# Patient Record
Sex: FEMALE | Race: WHITE | Employment: PART TIME | URBAN - METROPOLITAN AREA
[De-identification: names, ages, dates, MRNs, and addresses within clinical notes are randomized per-mention and may not be internally consistent; named-entity substitution may affect disease eponyms.]

---

## 2022-10-06 ENCOUNTER — INITIAL PRENATAL (OUTPATIENT)
Dept: OBGYN CLINIC | Age: 27
End: 2022-10-06

## 2022-10-06 VITALS
HEIGHT: 64 IN | HEART RATE: 103 BPM | BODY MASS INDEX: 30.73 KG/M2 | SYSTOLIC BLOOD PRESSURE: 129 MMHG | DIASTOLIC BLOOD PRESSURE: 82 MMHG | WEIGHT: 180 LBS

## 2022-10-06 DIAGNOSIS — Z12.4 CERVICAL CANCER SCREENING: ICD-10-CM

## 2022-10-06 DIAGNOSIS — Z83.49 FH: CYSTIC FIBROSIS: ICD-10-CM

## 2022-10-06 DIAGNOSIS — Z82.79 FAMILY HISTORY OF CONGENITAL ANOMALIES: ICD-10-CM

## 2022-10-06 DIAGNOSIS — Z34.00 ENCOUNTER FOR SUPERVISION OF NORMAL INTRAUTERINE PREGNANCY IN PRIMIGRAVIDA, ANTEPARTUM: Primary | ICD-10-CM

## 2022-10-06 DIAGNOSIS — Z11.3 VENEREAL DISEASE SCREENING: ICD-10-CM

## 2022-10-06 DIAGNOSIS — F32.A DEPRESSION, UNSPECIFIED DEPRESSION TYPE: ICD-10-CM

## 2022-10-06 DIAGNOSIS — F41.9 ANXIETY: ICD-10-CM

## 2022-10-06 PROCEDURE — 0501F PRENATAL FLOW SHEET: CPT | Performed by: OBSTETRICS & GYNECOLOGY

## 2022-10-06 RX ORDER — MULTIVITAMIN/FOLIC ACID/BIOTIN 400-2000
TABLET ORAL
COMMUNITY

## 2022-10-06 RX ORDER — BUSPIRONE HYDROCHLORIDE 7.5 MG/1
7.5 TABLET ORAL AS NEEDED
COMMUNITY

## 2022-10-06 NOTE — PROGRESS NOTES
[address] [facility]  [email or web address]  [phone]    Radha Rajput MD, [address] [facility]     Chief complaint:  Irregular cycles  Last cycle; Patient's last menstrual period was 07/28/2022. This is a new concern and an evaluation is planned. The patient has not been previously tested for hemoglobin electrophoresis, CF, or SMA. Pt reports her  has a fam hx of CF. Cramping is like period cramps 3-4 out of 10. Pt prefers exam without partner in the room, pt has a hx of a painful pap smear. Current pregnancy history:  Brittany Phillips is a No obstetric history on file. , 32 y.o. female [address] [facility]   She presents for the evaluation of irregular menses and a positive pregnancy test.    LMP history:  Patient's last menstrual period was 07/28/2022. .  The date of the beginning of her last menstrual period is certain. Her menses are regular. Her cycles occur about every 4 weeks. A urine pregnancy test was positive about 4 weeks ago. She was not using contraception at the estimated time of conception. Based on her LMP, her EGA is 10 weeks,0 days with and EDC of 5/4/23. Ultrasound data:  She had an ultrasound today which revealed a viable shetty pregnancy with a gestational age of 9 weeks and 3 days giving an EDC of 5/1/23. TA ULTRASOUND PERFORMED  A SINGLE VIABLE 10W3D IUP IS SEEN WITH NORMAL CARDIAC RHYTHM. GESTATIONAL AGE BASED ON TODAYS ULTRASOUND. A NORMAL YOLK SAC IS SEEN. RIGHT OVARY APPEARS WITHIN NORMAL LIMITS. LEFT OVARY APPEARS WITHIN NORMAL LIMITS. NO FREE FLUID SEEN IN THE CDS. Pregnancy symptoms:  She reports fatigue  breast tenderness  nausea  positive home pregnancy test  frequent urination. She denies bleeding. Since she found out she is pregnant, she has gained, 12 lbs. She reports her prepregnancy weight as 168 pounds. Relevant past pregnancy history:  She has the following pregnancy history:none.   She does not have a history of  delivery. She does not have a history of a prior  section. Relevant past medical history:(relevant to this pregnancy):   none     Pap smear history:  Last pap smear: 2017    Occupational history  Her occupation is: part time job doing RN in [employer]. Substance history:   She does not report current tobacco use. She does not report current alcohol use. She does not report current drug use. Exposure history: There are indoor cat(s) in the home. The patient was instructed not to change cat litter boxes during pregnancy. She does report close contact with children on a regular basis. She has chicken pox or the vaccine in the past.   Patient does not report issues with domestic violence. Genetic Screening/Teratology Counseling:   (Includes patient, baby's father, or anyone in either family with:)  3.  Patient's age >/= 28 at EDC?--27      FOB age: 34years old. 2.  Thalassemia ([facility], [facility], [address] [facility], or  background): MCV<80?--no  3. Neural tube defect (meningomyelocele, spina bifida, anencephaly)? --no  4. Congenital heart defect?-- mob niece:  from congenital heart defect: 6mos  5. Down syndrome?--no  6. Jose R-Sachs ([facility])? --no  7. Canavan's Disease?--no  8. Familial Dysautonomia?--no   9. Sickle cell disease or trait ()? --no   Has she been tested for sickle trait: Unknown  10. Hemophilia or other blood disorders?--no  11. Muscular dystrophy?--no  12. Cystic fibrosis? --yes and FOB uncle (paternal)  15. Scott's Chorea?--no  14. Mental retardation/autism (if yes was person tested for Fragile X)?-no  15. Other inherited genetic or chromosomal disorder?- no  16. Maternal metabolic disorder (DM, PKU, etc)? --no  17. Patient or FOB with a child with a birth defect not listed above?--no  17a. Patient or FOB with a birth defect themselves?--no  25. Recurrent pregnancy loss, or stillbirth?--no  19.   Any medications since LMP other than prenatal vitamins (include vitamins, supplements, OTC meds, drugs, alcohol)? --   Current Outpatient Medications   Medication Instructions    busPIRone (BUSPAR) 7.5 mg, Oral, AS NEEDED    calcium/mag/vitamin D2/Zn/min (JEF-MAG ZINC II PO) Oral    multivitamin-folic acid-biotin (Hair-Skin-Nails, mv-FA-biotin,) 400-2,000 mcg tab Oral    PNV Comb #2-Iron-FA-Omega 3 29-1-400 mg cmpk Oral   zofran    20. Any other genetic/environmental exposure to discuss?--no. Infection History:  1. Lives with someone with TB or TB exposed?--no  2. Patient or partner has history of genital herpes?--no  3. Rash or viral illness since LMP?--no  4. History of STD (GC, CT, HPV, syphilis, HIV)? --no  5. Have you received a flu vaccine for the most recent flu season? -- no  6. Have you or your sexual partner(s) travelled to a [facility] area in the last six months? -- no  7. Have you received the COVID-19 vaccine? -- no    History reviewed. No pertinent past medical history. History reviewed. No pertinent surgical history. Social History     Occupational History    Not on file   Tobacco Use    Smoking status: Never    Smokeless tobacco: Never   Vaping Use    Vaping Use: Never used   Substance and Sexual Activity    Alcohol use: Not Currently    Drug use: Never    Sexual activity: Yes     Partners: Male     Family History   Problem Relation Age of Onset    Thyroid Disease Mother         hypo    Diabetes Father     Hypertension Father     Multiple myeloma Maternal Grandfather      OB History    Para Term  AB Living   1             SAB IAB Ectopic Molar Multiple Live Births                    # Outcome Date GA Lbr John Paul/2nd Weight Sex Delivery Anes PTL Lv   1 Current              Not on File  Prior to Admission medications    Medication Sig Start Date End Date Taking? Authorizing Provider   busPIRone (BUSPAR) 7.5 mg tablet Take 7.5 mg by mouth as needed.    Yes Provider, Historical   PNV Comb #2-Iron-FA-Omega 3 29-1-400 mg cmpk Take  by mouth. Yes Provider, Historical   calcium/mag/vitamin D2/Zn/min (JEF-MAG ZINC II PO) Take  by mouth. Yes Provider, Historical   multivitamin-folic acid-biotin (Hair-Skin-Nails, mv-FA-biotin,) 400-2,000 mcg tab Take  by mouth. Yes Provider, Historical        Review of Systems - History obtained from the patient  Constitutional: negative for weight loss, fever, night sweats  HEENT: negative for hearing loss, earache, congestion, snoring, sorethroat  CV: negative for chest pain, palpitations, edema  Resp: negative for cough, shortness of breath, wheezing  GI: negative for change in bowel habits, abdominal pain, black or bloody stools  : negative for frequency, dysuria, hematuria, vaginal discharge  MSK: negative for back pain, joint pain, muscle pain  Breast: negative for breast lumps, nipple discharge, galactorrhea  Skin :negative for itching, rash, hives  Neuro: negative for dizziness, headache, confusion, weakness  Psych: negative for anxiety, depression, change in mood  Heme/lymph: negative for bleeding, bruising, pallor    Objective:  Visit Vitals  /82   Pulse (!) 103   Ht 5' 4\" (1.626 m)   Wt 180 lb (81.6 kg)   LMP 07/28/2022   BMI 30.90 kg/m²       Physical Exam:   Constitutional  Appearance: well-nourished, well developed, alert, in no acute distress    HENT  Head  Face: appears normal  Eyes: appear normal  Ears: normal  Mouth: normal  Lips: no lesions    Neck  Inspection/Palpation: normal appearance, no masses or tenderness  Lymph Nodes: no lymphadenopathy present  Thyroid: gland size normal, nontender, no nodules or masses present on palpation    Chest  Respiratory Effort: breathing unlabored  Auscultation: normal breath sounds    Cardiovascular  Heart:   Auscultation: regular rate and rhythm without murmur    Breasts  Inspection of Breasts: breasts symmetrical, no skin changes, no discharge present, nipple appearance normal, no skin retraction present  Palpation of Breasts and Axillae: no masses present on palpation, no breast tenderness  Axillary Lymph Nodes: no lymphadenopathy present    Gastrointestinal  Abdominal Examination: abdomen non-tender to palpation, normal bowel sounds, no masses present  Liver and spleen: no hepatomegaly present, spleen not palpable  Hernias: no hernias identified    Genitourinary  External Genitalia: normal appearance for age, no discharge present, no tenderness present, no inflammatory lesions present, no masses present, no atrophy present  Vagina: normal vaginal vault without central or paravaginal defects, no discharge present, no inflammatory lesions present, no masses present  Bladder: non-tender to palpation  Urethra: appears normal  Cervix: normal appearing with discharge or lesions, os closed  Uterus: enlarged, normal shape, soft  Adnexa: no adnexal tenderness present, no adnexal masses present  Perineum: perineum within normal limits, no evidence of trauma, no rashes or skin lesions present  Anus: anus within normal limits, no hemorrhoids present  Inguinal Lymph Nodes: no lymphadenopathy present    Skin  General Inspection: no rash, no lesions identified    Neurologic/Psychiatric  Mental Status:  Orientation: grossly oriented to person, place and time  Mood and Affect: mood normal, affect appropriate    Assessment:   Irregular cycles  Encounter Diagnoses   Name Primary?     Encounter for supervision of normal intrauterine pregnancy in primigravida, antepartum Yes    Venereal disease screening     Cervical cancer screening     Family history of congenital anomalies     FH: cystic fibrosis     Anxiety     Depression, unspecified depression type      Due date: lmp cw US  Anxiety/depression on buspar, Wellbutrin  Constipation  Hemorrhoids  Ho back pain      Plan:   We discussed options of genetic screening and diagnostic testing including:  CF testing, CVS, amniocentesis first trimester screening/NT, MSAFP, and NIPT (handout given to patient for review and consent)  Disc safer options for constipation, bowel regimen ho  She is interested in prenatal genetic testing of her fetus. Plan: nips horizon, mfm fs  The course of pregnancy discussed including visit schedule, ultrasounds, lab testing, etc.  Pt advised to avoid alcoholic beverages and illicit/recreational drugs use  Recommend taking prenatal vitamins or folic acid daily with DHA/fish oil. The hospital and practice style discussed with coverage system. We discussed nutrition, toxoplasmosis precautions, sexual activity, exercise, need for influenza vaccine, environmental and work hazards, travel advice, screen for domestic violence, need for seat belts. We discussed seafood, unpasteurized dairy products, deli meat, artificial sweeteners, and caffeine intake. We recommend avoiding chemical and toxin exposures when possible. Information on prenatal and breastfeeding classes given. Information on circumcision given in ACOG packet. Patient encouraged not to smoke. Discussed current prescription drug use. Given medication list.  Discussed the use of over the counter medications and chemicals. She is advised to contact her MD with any questions. Pt understands risk of hemorrhage during pregnancy and post delivery and would accept blood products if necessary in life-threatening emergencies  We discussed signs and symptoms of abnormal pregnancies and miscarriage. Handouts given to pt. We discussed potential risk of pregnancy and maternal complications if patient has a COVID-19 infection during pregnancy and reviewed COVID-19 precautions and avoiding high risk transmission situations. We discussed that untreated or undertreated depression and anxiety is a significant maternal risk factor in pregnancy. Women with depression are less likely to care for themselves during pregnancy and are more likely to have fetal growth restriction and  delivery.  We also discussed the risk of poor maternal infant bonding. Additionally, those women are at significantly higher risk for severe postpartum depression. Antidepressants are safe to use while breastfeeding. Women who take antidepressants are more likely to successfully breastfeed that women who don't. The only known risk to the baby is \"jitteriness\" shortly after birth. We discussed that the benefits of antidepressant use in mom outweighs the small risk to the fetus. Disc can resume wellbutrin if needed (or wait until > 12 wks to decrease first tri exposure risk)    Physician review of ultrasound performed by technician  Today's ultrasound report and images were reviewed and discussed with the patient. Please see images and imaging report entered by technician in PACS for more detail and progress note and diagnosis entered by MD.    Belem Esparza MD    Orders Placed This Encounter    CULTURE, URINE    HEP B SURFACE AG    HIV [address], [address] [facility]. W/REFLEX CONFIRM    CBC W/O DIFF    RUBELLA AB, IGG    RPR    HEPATITIS C AB    MISC. LAB TEST    MISC. LAB TEST    REFERRAL TO PERINATOLOGY    TYPE & SCREEN    busPIRone (BUSPAR) 7.5 mg tablet    PNV Comb #2-Iron-FA-Omega 3 29-1-400 mg cmpk    calcium/mag/vitamin D2/Zn/min (JEF-MAG ZINC II PO)    multivitamin-folic acid-biotin (Hair-Skin-Nails, mv-FA-biotin,) 400-2,000 mcg tab    PAP IG, CT-NG-TV, RFX APTIMA HPV ASCUS ([id number])     Follow-up and Dispositions    Return in 4 weeks (on 11/3/2022) for ready labs today,  signup, Follow up OB visit. On this date, 10/6/2022,  I have spent 45 minutes reviewing previous notes, test results and face to face with the patient discussing the diagnosis and importance of compliance with the treatment plan as well as documenting on the day of the visit.

## 2022-10-09 LAB
ABO GROUP BLD: NORMAL
BACTERIA UR CULT: NO GROWTH
BLD GP AB SCN SERPL QL: NEGATIVE
ERYTHROCYTE [DISTWIDTH] IN BLOOD BY AUTOMATED COUNT: 12.9 % (ref 11.7–15.4)
HBV SURFACE AG SERPL QL IA: NEGATIVE
HCT VFR BLD AUTO: 41 % (ref 34–46.6)
HCV AB S/CO SERPL IA: <0.1 S/CO RATIO (ref 0–0.9)
HGB BLD-MCNC: 13.5 G/DL (ref 11.1–15.9)
HIV 1+2 AB+HIV1 P24 AG SERPL QL IA: NON REACTIVE
MCH RBC QN AUTO: 29.7 PG (ref 26.6–33)
MCHC RBC AUTO-ENTMCNC: 32.9 G/DL (ref 31.5–35.7)
MCV RBC AUTO: 90 FL (ref 79–97)
PLATELET # BLD AUTO: 358 X10E3/UL (ref 150–450)
RBC # BLD AUTO: 4.55 X10E6/UL (ref 3.77–5.28)
RH BLD: POSITIVE
RPR SER QL: NON REACTIVE
RUBV IGG SERPL IA-ACNC: 3.74 INDEX
WBC # BLD AUTO: 14.7 X10E3/UL (ref 3.4–10.8)

## 2022-10-10 NOTE — PROGRESS NOTES
Normal results, add to prenatal records. We can review in detail with patient at next visit.   Add to PL: \"Initial PNL done\"

## 2022-10-13 LAB
C TRACH RRNA CVX QL NAA+PROBE: NEGATIVE
CYTOLOGIST CVX/VAG CYTO: NORMAL
CYTOLOGY CVX/VAG DOC CYTO: NORMAL
CYTOLOGY CVX/VAG DOC THIN PREP: NORMAL
DX ICD CODE: NORMAL
LABCORP, 190119: NORMAL
Lab: NORMAL
N GONORRHOEA RRNA CVX QL NAA+PROBE: NEGATIVE
OTHER STN SPEC: NORMAL
STAT OF ADQ CVX/VAG CYTO-IMP: NORMAL
T VAGINALIS RRNA SPEC QL NAA+PROBE: NEGATIVE

## 2022-10-14 NOTE — PROGRESS NOTES
Normal pap smear, message sent if [facility] active. Update PMH/: include: Date of pap, Cytology: wnl. For HR HPV results: list NEG or POS, when done.

## 2022-10-17 NOTE — PROGRESS NOTES
NIPS low risk, horizon low risk 4/4: update PNL  Notify pt if EpicTopichart message not read or not active. Notify pt of gender, if desired.   Add to PL: NIPS- low risk, add sex if patient find out (XX or XY: see report)  Update PNL  Confirm 20 wk US scheduled: add date and location ([facility])  to PL

## 2022-10-18 ENCOUNTER — TELEPHONE (OUTPATIENT)
Dept: OBGYN CLINIC | Age: 27
End: 2022-10-18

## 2022-10-18 NOTE — TELEPHONE ENCOUNTER
Pt called name and  verified. pt is a  [id number] gestation. Pt states she clicked on image of [employer] results and was able to see gender which she did not want to know. Apologized to patient ,patient wanted to know accuracy of blood test gave kenan from [facility] to ask her.

## 2022-11-16 ENCOUNTER — ROUTINE PRENATAL (OUTPATIENT)
Dept: OBGYN CLINIC | Age: 27
End: 2022-11-16
Payer: OTHER GOVERNMENT

## 2022-11-16 VITALS
BODY MASS INDEX: 32.1 KG/M2 | DIASTOLIC BLOOD PRESSURE: 74 MMHG | HEART RATE: 96 BPM | HEIGHT: 64 IN | WEIGHT: 188 LBS | SYSTOLIC BLOOD PRESSURE: 110 MMHG

## 2022-11-16 DIAGNOSIS — Z34.00 PRENATAL CARE OF PRIMIGRAVIDA, ANTEPARTUM: ICD-10-CM

## 2022-11-16 DIAGNOSIS — Z34.00 ENCOUNTER FOR SUPERVISION OF NORMAL INTRAUTERINE PREGNANCY IN PRIMIGRAVIDA, ANTEPARTUM: Primary | ICD-10-CM

## 2022-11-16 DIAGNOSIS — Z23 NEEDS FLU SHOT: ICD-10-CM

## 2022-11-16 LAB — AFP, MATERNAL, EXTERNAL: NORMAL

## 2022-11-16 PROCEDURE — 90471 IMMUNIZATION ADMIN: CPT | Performed by: OBSTETRICS & GYNECOLOGY

## 2022-11-16 PROCEDURE — 90686 IIV4 VACC NO PRSV 0.5 ML IM: CPT | Performed by: OBSTETRICS & GYNECOLOGY

## 2022-11-16 PROCEDURE — 0502F SUBSEQUENT PRENATAL CARE: CPT | Performed by: OBSTETRICS & GYNECOLOGY

## 2022-11-16 NOTE — PATIENT INSTRUCTIONS
Vaccine Information Statement    Influenza (Flu) Vaccine (Inactivated or Recombinant): What You Need to Know    Many vaccine information statements are available in [place]ian and other languages. See www.immunize.org/vis. Hojas de información sobre vacunas están disponibles en español y en muchos otros idiomas. Visite www.immunize.org/vis. 1. Why get vaccinated? Influenza vaccine can prevent influenza (flu). Flu is a contagious disease that spreads around the [facility] every year, usually between October and May. Anyone can get the flu, but it is more dangerous for some people. Infants and young children, people 72 years and older, pregnant people, and people with certain health conditions or a weakened immune system are at greatest risk of flu complications. Pneumonia, bronchitis, sinus infections, and ear infections are examples of flu-related complications. If you have a medical condition, such as heart disease, cancer, or diabetes, flu can make it worse. Flu can cause fever and chills, sore throat, muscle aches, fatigue, cough, headache, and runny or stuffy nose. Some people may have vomiting and diarrhea, though this is more common in children than adults. In an average year, thousands of people in the [facility] die from flu, and many more are hospitalized. Flu vaccine prevents millions of illnesses and flu-related visits to the doctor each year. 2. Influenza vaccines     CDC recommends everyone 6 months and older get vaccinated every flu season. Children 6 months through 6years of age may need 2 doses during a single flu season. Everyone else needs only 1 dose each flu season. It takes about 2 weeks for protection to develop after vaccination. There are many flu viruses, and they are always changing. Each year a new flu vaccine is made to protect against the influenza viruses believed to be likely to cause disease in the upcoming flu season.  Even when the vaccine doesnt exactly match these viruses, it may still provide some protection. Influenza vaccine does not cause flu. Influenza vaccine may be given at the same time as other vaccines. 3. Talk with your health care provider    Tell your vaccination provider if the person getting the vaccine:  Has had an allergic reaction after a previous dose of influenza vaccine, or has any severe, life-threatening allergies   Has ever had Guillain-Barré Syndrome (also called GBS)    In some cases, your health care provider may decide to postpone influenza vaccination until a future visit. Influenza vaccine can be administered at any time during pregnancy. People who are or will be pregnant during influenza season should receive inactivated influenza vaccine. People with minor illnesses, such as a cold, may be vaccinated. People who are moderately or severely ill should usually wait until they recover before getting influenza vaccine. Your health care provider can give you more information. 4. Risks of a vaccine reaction    Soreness, redness, and swelling where the shot is given, fever, muscle aches, and headache can happen after influenza vaccination. There may be a very small increased risk of Guillain-Barré Syndrome (GBS) after inactivated influenza vaccine (the flu shot). Kyler Rios children who get the flu shot along with pneumococcal vaccine (PCV13) and/or DTaP vaccine at the same time might be slightly more likely to have a seizure caused by fever. Tell your health care provider if a child who is getting flu vaccine has ever had a seizure. People sometimes faint after medical procedures, including vaccination. Tell your provider if you feel dizzy or have vision changes or ringing in the ears. As with any medicine, there is a very remote chance of a vaccine causing a severe allergic reaction, other serious injury, or death. 5. What if there is a serious problem?     An allergic reaction could occur after the vaccinated person leaves the clinic. If you see signs of a severe allergic reaction (hives, swelling of the face and throat, difficulty breathing, a fast heartbeat, dizziness, or weakness), call 9-1-1 and get the person to the nearest hospital.    For other signs that concern you, call your health care provider. Adverse reactions should be reported to the Vaccine Adverse Event Reporting System (VAERS). Your health care provider will usually file this report, or you can do it yourself. Visit the VAERS website at www.vaers. [facility].gov or call 7-148.656.5530. VAERS is only for reporting reactions, and VAERS staff members do not give medical advice. 6. The National Vaccine Injury Compensation Program    The [facility] Vaccine Injury Compensation Program (VICP) is a federal program that was created to compensate people who may have been injured by certain vaccines. Claims regarding alleged injury or death due to vaccination have a time limit for filing, which may be as short as two years. Visit the VICP website at www.[facility].gov/vaccinecompensation or call 1-103.712.5707 to learn about the program and about filing a claim. 7. How can I learn more? Ask your health care provider. Call your local or state health department. Visit the website of the Food and Drug Administration (FDA) for vaccine package inserts and additional information at www.fda.gov/vaccines-blood-biologics/vaccines. Contact the Centers for Disease Control and Prevention (CDC): Call 6-140.560.1134 (1-800-CDC-INFO) or  Visit CDCs influenza website at www.cdc.gov/flu. Vaccine Information Statement   Inactivated Influenza Vaccine   8/6/2021  [id number]   Department of Health and Human Services  Centers for Disease Control and Prevention    Office Use Only

## 2022-11-18 LAB
AFP INTERP SERPL-IMP: NORMAL
AFP INTERP SERPL-IMP: NORMAL
AFP MOM SERPL: 1.43
AFP SERPL-MCNC: 41.2 NG/ML
AGE AT DELIVERY: 28.1 YR
COMMENT, 018013: NORMAL
GA METHOD: NORMAL
GA: 15.6 WEEKS
IDDM PATIENT QL: NO
MULTIPLE PREGNANCY: NO
NEURAL TUBE DEFECT RISK FETUS: 3311 %
RESULTS, 017004: NORMAL

## 2022-11-23 NOTE — PROGRESS NOTES
Meng Dyer is a 32 y.o. female who presents for flu immunization per verbal order from Misa Mcghee MD.  She denies any symptoms , reactions or allergies that would exclude them from being immunized today. Risks and adverse reactions were discussed and the VIS was given to her. All questions were addressed. She was observed for 15 min post injection. There were no reactions observed.
Normal results, add to prenatal records. We can review in detail with patient at next visit. Josefina W Morris Bowen message sent if active.
Updated PNL
_ [facility]  [email or web address]  [phone]  Douglas Stewart MD     Follow-up OB visit    Patient Active Problem List    Diagnosis Date Noted    Prenatal care of primigravida, antepartum 10/06/2022       Vitals:    22 1413   BP: 110/74   Pulse: 96   Weight: 188 lb (85.3 kg)   Height: 5' 4\" (1.626 m)     See PN flowsheet for exam    32 y.o.  15w6d   Encounter Diagnosis   Name Primary? Encounter for supervision of normal intrauterine pregnancy in primigravida, antepartum Yes        [] SAB/bleeding precautions reviewed   [] PTL/PPROM precautions reviewed   [x] Labor precautions reviewed   [] Fetal kick counts discussed   [] Labs reviewed with patient   [] Valentín Inoue precautions reviewed   [] Consent reviewed   [] Handouts given to pt   [] Glucola handout    [] GBS/labor/Magic Hour handout   []    [] We reviewed CDC recommendations for Tdap for patient and close contacts and RBA of receiving in pregnancy, advised obtaining in third trimester   [] Reviewed healthy nutrition in pregnancy and good exercise practices   [] We disc safer medications in pregnancy and referred patient to [facility] ERAST[facility] resources   [] We reviewed CDC recommendations for flu vaccine and RBA of receiving in pregnancy   []    []    []           No orders of the defined types were placed in this encounter.       Douglas Stewart MD
denies pain/discomfort

## 2022-11-30 ENCOUNTER — TELEPHONE (OUTPATIENT)
Dept: OBGYN CLINIC | Age: 27
End: 2022-11-30

## 2022-11-30 NOTE — TELEPHONE ENCOUNTER
32year old patient  17w6d pregnant    Patient calling to say that she woke up at 4am and her sleeping shorts were saturated and the bed was not wet. Patient reports cleaning up and when she woke up this am the new shorts damp but not wet as described above    Patient denies vaginal bleeding, contractions and has felt flutters of baby movement    Patient reports intercourse a couple of days ago    ? ov   patient has next appointment tomorrow 2022    Please advise

## 2022-11-30 NOTE — TELEPHONE ENCOUNTER
Alcira George MD  to Me    TP    10:44 AM   Rec 220 today if persistent leaking  (ob problem visit)   Or keep fu if not persistent     17w6d     TRP       Patient advised of MD recommendations and reports her underwear have not been wet since the earlier episode and she will continue to monitor at this time and keep her appointment for tomorrow    Patient verbalized understanding.

## 2022-12-01 ENCOUNTER — ROUTINE PRENATAL (OUTPATIENT)
Dept: OBGYN CLINIC | Age: 27
End: 2022-12-01
Payer: OTHER GOVERNMENT

## 2022-12-01 VITALS
BODY MASS INDEX: 32.4 KG/M2 | WEIGHT: 189.8 LBS | SYSTOLIC BLOOD PRESSURE: 114 MMHG | DIASTOLIC BLOOD PRESSURE: 78 MMHG | HEIGHT: 64 IN | HEART RATE: 102 BPM

## 2022-12-01 DIAGNOSIS — Z34.00 PRENATAL CARE OF PRIMIGRAVIDA, ANTEPARTUM: ICD-10-CM

## 2022-12-01 DIAGNOSIS — N89.8 VAGINAL DISCHARGE: Primary | ICD-10-CM

## 2022-12-01 LAB — FERN TEST, (POC): NORMAL

## 2022-12-01 PROCEDURE — 99212 OFFICE O/P EST SF 10 MIN: CPT | Performed by: OBSTETRICS & GYNECOLOGY

## 2022-12-01 PROCEDURE — 89060 EXAM SYNOVIAL FLUID CRYSTALS: CPT | Performed by: OBSTETRICS & GYNECOLOGY

## 2022-12-01 NOTE — PROGRESS NOTES
[address] [facility]  [email or web address]  [phone]    Davie Knott MD, FACOG       OB/GYN: OB Problem visit    Chief Complaint:   Chief Complaint   Patient presents with    Routine Prenatal Visit       Patient Active Problem List    Diagnosis    Prenatal care of primigravida, antepartum     Fh gregg anom: fs  Fh cf fob  Constipation/hemorroids  Anxiety depression on buspar, d/c wellbutrin  EPDS NV  10/6/2022  NIPS/Horizon  Normal prenatal  Initial prenatal done  NIPS low risk  Horizon low risk 4/4 11/16/22 flu shot given  11/16/22 AFP normal         Vaginal discharge, back to baseline, had gush yesterday, no SA x > 48 hrs    Per Rooming Note:    The patient is reporting having:  pt woke up at 4am yesterday & noticed her shorts were saturated with fluid, bed not wet, denies more discharge or trickling or odor. MFM on 12/15/22. This is a new problem. This is a routinely scheduled follow up OB visit. She has not experienced this problem before. She reports the symptoms are has significantly improved. Aggravating factors include none. And alleviating factors include none. She does not have other concerns. PFSH:  Past Medical History:   Diagnosis Date    Pap smear for cervical cancer screening 10/06/2022    normal; hpv tested     History reviewed. No pertinent surgical history. Family History   Problem Relation Age of Onset    Thyroid Disease Mother         hypo    Diabetes Father     Hypertension Father     Multiple myeloma Maternal Grandfather      Social History     Tobacco Use    Smoking status: Never    Smokeless tobacco: Never   Vaping Use    Vaping Use: Never used   Substance Use Topics    Alcohol use: Not Currently    Drug use: Never     No Known Allergies  Current Outpatient Medications   Medication Sig    buPROPion XL (WELLBUTRIN XL) 150 mg tablet Take 1 Tablet by mouth daily for 90 days. PNV Comb #2-Iron-FA-Omega 3 29-1-400 mg cmpk Take  by mouth. calcium/mag/vitamin D2/Zn/min (JEF-MAG ZINC II PO) Take  by mouth.    multivitamin-folic acid-biotin (Hair-Skin-Nails, mv-FA-biotin,) 400-2,000 mcg tab Take  by mouth.    busPIRone (BUSPAR) 7.5 mg tablet Take 7.5 mg by mouth as needed. (Patient not taking: No sig reported)     No current facility-administered medications for this visit.        Review of Systems:  History obtained from the patient and written ROS questionnaire  Constitutional: negative for fevers, chills and weight loss  ENT ROS: negative for - hearing change, oral lesions or visual changes  Respiratory: negative for cough, wheezing or dyspnea on exertion  Cardiovascular: negative for chest pain, irregular heart beats, exertional chest pressure/discomfort  Gastrointestinal: negative for dysphagia, nausea and vomiting  Genito-Urinary ROS: no dysuria, trouble voiding, or hematuria, see HPI  Inteument/breast: negative for rash, breast lump and nipple discharge  Musculoskeletal:negative for stiff joints, neck pain and muscle weakness  Endocrine ROS: negative for - breast changes, galactorrhea or temperature intolerance  Hematological and Lymphatic ROS: negative for - blood clots, bruising or swollen lymph nodes    Physical Exam:  Visit Vitals  /78   Pulse (!) 102   Ht 5' 4\" (1.626 m)   Wt 189 lb 12.8 oz (86.1 kg)   BMI 32.58 kg/m²       GENERAL: alert, well appearing, and in no distress  HEAD; normocephalic, atraumatic  PULM: clear to auscultation, no wheezes, rales or rhonchi, symmetric air entry   COR: normal rate and regular rhythm, S1 and S2 normal   ABDOMEN: soft, nontender, nondistended, no masses or organomegaly   BACK: normal range of motion, no tenderness, no CVAT   EGBUS: no lesions, no inflammation, no masses  VULVA: normal appearing vulva with no masses, tenderness or lesions  VAGINA: normal appearing vagina with normal color, no lesions, white discharge  Ntz neg  CERVIX: normal appearing cervix without discharge or lesions, non tender  UTERUS: uterus is enlarged in size, gravid appropriate for gestational age  ADNEXA: normal adnexa in size, nontender and no masses  NEURO: alert, oriented, normal speech    See PN flowsheet for additional notes and exam    Assessment:  32 y.o.  18w0d   Encounter Diagnoses   Name Primary? Vaginal discharge Yes    Prenatal care of primigravida, antepartum        Plan:  An evaluation of this patient's concern is planned. The patient is advised that she should contact the office if she does not note improvement or if symptoms recur  She should contact our office with any questions or concerns  She could keep her routine OB appointment. We discussed potential causes of vaginal discharge/irritation. We discussed good vulvar hygiene. Recommended avoid vaginal irritants. Discussed use of mild soaps/detergents. Follow up if NI. Patient will be notified about swab results and prescription sent, if indicated. Disc support pants/belt prn  Disc rba of flagyl/bv in pregnancy      Orders Placed This Encounter    NUSWAB VAGINITIS PLUS ([facility])    AMB POC FERN TEST       Results for orders placed or performed in visit on 22   AMB POC FERN TEST   Result Value Ref Range    FERN test, (POC)      Narrative    Negative, no ferning.         Jonah Morales MD

## 2022-12-01 NOTE — PROGRESS NOTES
Rooming note, OB problem visit    Chief Complaint   Patient presents with    Routine Prenatal Visit     Sirena Davila is a 32 y.o. female presents for a problem visit. 18w0d    The patient is reporting having:  pt woke up at 4am yesterday & noticed her shorts were saturated with fluid, bed not wet, denies more discharge or trickling or odor. MFM on 12/15/22. This is a new problem. This is a routinely scheduled follow up OB visit. She has not experienced this problem before. She reports the symptoms are has significantly improved. Aggravating factors include none. And alleviating factors include none. She does not have other concerns. 1. Have you been to the ER, urgent care clinic, or hospitalized since your last visit? No    2. Have you seen or consulted any other health care providers outside of the [address] [place] [address] since your last visit?  No

## 2022-12-04 LAB
A VAGINAE DNA VAG QL NAA+PROBE: NORMAL SCORE
BVAB2 DNA VAG QL NAA+PROBE: NORMAL SCORE
C ALBICANS DNA VAG QL NAA+PROBE: NEGATIVE
C GLABRATA DNA VAG QL NAA+PROBE: NEGATIVE
C TRACH DNA VAG QL NAA+PROBE: NEGATIVE
MEGA1 DNA VAG QL NAA+PROBE: NORMAL SCORE
N GONORRHOEA DNA VAG QL NAA+PROBE: NEGATIVE
T VAGINALIS DNA VAG QL NAA+PROBE: NEGATIVE

## 2022-12-06 NOTE — PROGRESS NOTES
The results are normal, no clear evidence of vaginal infection  [address] message sent if active. Recommend f/u if still having symptoms/problems or has additional concerns.

## 2022-12-15 ENCOUNTER — HOSPITAL ENCOUNTER (OUTPATIENT)
Dept: PERINATAL CARE | Age: 27
Discharge: HOME | End: 2022-12-15
Attending: OBSTETRICS & GYNECOLOGY
Payer: OTHER GOVERNMENT

## 2022-12-15 PROCEDURE — 76811 OB US DETAILED SNGL FETUS: CPT | Performed by: OBSTETRICS & GYNECOLOGY

## 2022-12-20 NOTE — PROGRESS NOTES
Can you confirm w MFM and on consult referral that has FH congenital cardiac anomaly (niece)?   Thanks

## 2022-12-21 NOTE — PROGRESS NOTES
Call to [facility]- they are looking into it. See phone encounter for additional correspondence.

## 2022-12-28 ENCOUNTER — ROUTINE PRENATAL (OUTPATIENT)
Dept: OBGYN CLINIC | Age: 27
End: 2022-12-28
Payer: OTHER GOVERNMENT

## 2022-12-28 VITALS
SYSTOLIC BLOOD PRESSURE: 128 MMHG | BODY MASS INDEX: 33.64 KG/M2 | WEIGHT: 196 LBS | DIASTOLIC BLOOD PRESSURE: 83 MMHG | HEART RATE: 121 BPM

## 2022-12-28 DIAGNOSIS — Z3A.21 21 WEEKS GESTATION OF PREGNANCY: ICD-10-CM

## 2022-12-28 DIAGNOSIS — Z34.00 PRENATAL CARE OF PRIMIGRAVIDA, ANTEPARTUM: ICD-10-CM

## 2022-12-28 DIAGNOSIS — R00.2 PALPITATIONS: Primary | ICD-10-CM

## 2022-12-28 DIAGNOSIS — R00.0 TACHYCARDIA: ICD-10-CM

## 2022-12-28 PROCEDURE — 0502F SUBSEQUENT PRENATAL CARE: CPT | Performed by: OBSTETRICS & GYNECOLOGY

## 2022-12-28 NOTE — PROGRESS NOTES
_ [facility]  [email or web address]  [phone]  Uma Richardson MD     Follow-up OB visit    Patient Active Problem List    Diagnosis Date Noted    Prenatal care of primigravida, antepartum 10/06/2022       Vitals:    22 1040   BP: 128/83   Pulse: (!) 121   Weight: 196 lb (88.9 kg)     See PN flowsheet for exam  1can diet soda/day. Occ palpitations; even before pregnancy. + sob x1 when lying down xmas william. Chest  Respiratory Effort: breathing normal        Auscultation: normal breath sounds, clear bilaterally    Cardiovascular  Heart: Auscultation: regular rate and rhythm without murmur, normal S1, S2  Ext no c/t/e    32 y.o.  21w6d   Routine prenatal visit  Encounter Diagnoses   Name Primary? Palpitations Yes    Tachycardia     Prenatal care of primigravida, antepartum      PE/DVT precautions, rec to ER if sx persist/severe.   Cardiology referral  Tsh  Cbc  Disc rba of 81 mg asa, pt will begin   [] SAB/bleeding precautions reviewed   [] PTL/PPROM precautions reviewed   [] Labor precautions reviewed   [] Fetal kick counts discussed   [] Labs reviewed with patient   [] [place] precautions reviewed   [] Consent reviewed   [] Handouts given to pt   [] Glucola handout    [] GBS/labor/Magic Hour handout   []    [] We reviewed CDC recommendations for Tdap for patient and close contacts and RBA of receiving in pregnancy, advised obtaining in third trimester   [] Reviewed healthy nutrition in pregnancy and good exercise practices   [] We disc safer medications in pregnancy and referred patient to [facility]   [] We reviewed CDC recommendations for flu vaccine and RBA of receiving in pregnancy   []    []    []           Orders Placed This Encounter    TSH 3RD GENERATION    CBC W/O DIFF       Uma Richardson MD

## 2022-12-29 LAB
ERYTHROCYTE [DISTWIDTH] IN BLOOD BY AUTOMATED COUNT: 13 % (ref 11.5–14.5)
HCT VFR BLD AUTO: 39.7 % (ref 35–47)
HGB BLD-MCNC: 12.9 G/DL (ref 11.5–16)
MCH RBC QN AUTO: 30.3 PG (ref 26–34)
MCHC RBC AUTO-ENTMCNC: 32.5 G/DL (ref 30–36.5)
MCV RBC AUTO: 93.2 FL (ref 80–99)
NRBC # BLD: 0 K/UL (ref 0–0.01)
NRBC BLD-RTO: 0 PER 100 WBC
PLATELET # BLD AUTO: 307 K/UL (ref 150–400)
PMV BLD AUTO: 11.3 FL (ref 8.9–12.9)
RBC # BLD AUTO: 4.26 M/UL (ref 3.8–5.2)
TSH SERPL DL<=0.05 MIU/L-ACNC: 2.68 UIU/ML (ref 0.36–3.74)
WBC # BLD AUTO: 14.2 K/UL (ref 3.6–11)

## 2022-12-30 NOTE — PROGRESS NOTES
Normal results, add to prenatal records. We can review in detail with patient at next visit. [address] message sent if active.

## 2023-01-12 ENCOUNTER — HOSPITAL ENCOUNTER (OUTPATIENT)
Dept: PERINATAL CARE | Age: 28
Discharge: HOME | End: 2023-01-12
Attending: OBSTETRICS & GYNECOLOGY
Payer: OTHER GOVERNMENT

## 2023-01-12 PROCEDURE — 76816 OB US FOLLOW-UP PER FETUS: CPT | Performed by: OBSTETRICS & GYNECOLOGY

## 2023-01-26 ENCOUNTER — ROUTINE PRENATAL (OUTPATIENT)
Dept: OBGYN CLINIC | Age: 28
End: 2023-01-26
Payer: OTHER GOVERNMENT

## 2023-01-26 VITALS
SYSTOLIC BLOOD PRESSURE: 116 MMHG | HEIGHT: 64 IN | DIASTOLIC BLOOD PRESSURE: 81 MMHG | WEIGHT: 204.6 LBS | HEART RATE: 108 BPM | BODY MASS INDEX: 34.93 KG/M2

## 2023-01-26 DIAGNOSIS — Z34.00 PRENATAL CARE OF PRIMIGRAVIDA, ANTEPARTUM: Primary | ICD-10-CM

## 2023-01-26 PROCEDURE — 0502F SUBSEQUENT PRENATAL CARE: CPT | Performed by: OBSTETRICS & GYNECOLOGY

## 2023-01-26 NOTE — PROGRESS NOTES
[facility] OB-[facility]  [email or web address]  [phone]  Carmen Cui MD     Follow-up OB visit    Patient Active Problem List    Diagnosis Date Noted    Prenatal care of primigravida, antepartum 10/06/2022       Vitals:    23 0942 23 0943   BP: 129/87 116/81   Pulse: (!) 109 (!) 108   Weight: 204 lb 9.6 oz (92.8 kg)    Height: 5' 4\" (1.626 m)      See PN flowsheet for exam  Disc 28 wk labs nv  Disc yo andreas/bs classes     32 y.o.  26w0d   Routine prenatal visit  No diagnosis found. [] SAB/bleeding precautions reviewed   [] PTL/PPROM precautions reviewed   [] Labor precautions reviewed   [] Fetal kick counts discussed   [] Labs reviewed with patient   [] Db [place] precautions reviewed   [] Consent reviewed   [] Handouts given to pt   [] Glucola handout    [] GBS/labor/Magic Hour handout   []    [] We reviewed CDC recommendations for Tdap for patient and close contacts and RBA of receiving in pregnancy, advised obtaining in third trimester   [] Reviewed healthy nutrition in pregnancy and good exercise practices   [] We disc safer medications in pregnancy and referred patient to [facility] resources   [] We reviewed CDC recommendations for flu vaccine and RBA of receiving in pregnancy   []    []    []           No orders of the defined types were placed in this encounter.       Carmen Cui MD

## 2023-01-31 NOTE — PATIENT INSTRUCTIONS
Vaccine Information Statement    Tdap (Tetanus, Diphtheria, Pertussis) Vaccine: What You Need to Know     Many vaccine information statements are available in Polish and other languages. See www.immunize.org/vis. Hojas de información sobre vacunas están disponibles en español y en muchos otros idiomas. Visite www.immunize.org/vis. 1. Why get vaccinated? Tdap vaccine can prevent tetanus, diphtheria, and pertussis. Diphtheria and pertussis spread from person to person. Tetanus enters the body through cuts or wounds. TETANUS (T) causes painful stiffening of the muscles. Tetanus can lead to serious health problems, including being unable to open the mouth, having trouble swallowing and breathing, or death. DIPHTHERIA (D) can lead to difficulty breathing, heart failure, paralysis, or death. PERTUSSIS (aP), also known as whooping cough, can cause uncontrollable, violent coughing that makes it hard to breathe, eat, or drink. Pertussis can be extremely serious especially in babies and young children, causing pneumonia, convulsions, brain damage, or death. In teens and adults, it can cause weight loss, loss of bladder control, passing out, and rib fractures from severe coughing. 2. Tdap vaccine     Tdap is only for children 7 years and older, adolescents, and adults. Adolescents should receive a single dose of Tdap, preferably at age 6 or 15 years. Pregnant people should get a dose of Tdap during every pregnancy, preferably during the early part of the third trimester, to help protect the  from pertussis. Infants are most at risk for severe, life-threatening complications from pertussis. Adults who have never received Tdap should get a dose of Tdap.       Also, adults should receive a booster dose of either Tdap or Td (a different vaccine that protects against tetanus and diphtheria but not pertussis) every 10 years, or after 5 years in the case of a severe or dirty wound or burn.     Tdap may be given at the same time as other vaccines. 3. Talk with your health care provider    Tell your vaccination provider if the person getting the vaccine:  Has had an allergic reaction after a previous dose of any vaccine that protects against tetanus, diphtheria, or pertussis, or has any severe, life-threatening allergies   Has had a coma, decreased level of consciousness, or prolonged seizures within 7 days after a previous dose of any pertussis vaccine (DTP, DTaP, or Tdap)  Has seizures or another nervous system problem  Has ever had Guillain-Barré Syndrome (also called GBS)  Has had severe pain or swelling after a previous dose of any vaccine that protects against tetanus or diphtheria    In some cases, your health care provider may decide to postpone Tdap vaccination until a future visit. People with minor illnesses, such as a cold, may be vaccinated. People who are moderately or severely ill should usually wait until they recover before getting Tdap vaccine. Your health care provider can give you more information. 4. Risks of a vaccine reaction    Pain, redness, or swelling where the shot was given, mild fever, headache, feeling tired, and nausea, vomiting, diarrhea, or stomachache sometimes happen after Tdap vaccination. People sometimes faint after medical procedures, including vaccination. Tell your provider if you feel dizzy or have vision changes or ringing in the ears. As with any medicine, there is a very remote chance of a vaccine causing a severe allergic reaction, other serious injury, or death. 5. What if there is a serious problem? An allergic reaction could occur after the vaccinated person leaves the clinic.  If you see signs of a severe allergic reaction (hives, swelling of the face and throat, difficulty breathing, a fast heartbeat, dizziness, or weakness), call 9-1-1 and get the person to the nearest hospital.    For other signs that concern you, call your health care provider. Adverse reactions should be reported to the Vaccine Adverse Event Reporting System (VAERS). Your health care provider will usually file this report, or you can do it yourself. Visit the VAERS website at www.vaers. [facility].gov or call 6-323.968.3115. VAERS is only for reporting reactions, and VAERS staff members do not give medical advice. 6. The National Vaccine Injury Compensation Program    The [facility], [facility] within [facility] Vaccine Injury Compensation Program (VICP) is a federal program that was created to compensate people who may have been injured by certain vaccines. Claims regarding alleged injury or death due to vaccination have a time limit for filing, which may be as short as two years. Visit the VICP website at www.[facility].gov/vaccinecompensation or call 3-735.419.9162 to learn about the program and about filing a claim. 7. How can I learn more? Ask your health care provider. Call your local or state health department. Visit the website of the Food and Drug Administration (FDA) for vaccine package inserts and additional information at www.fda.gov/vaccines-blood-biologics/vaccines. Contact the Centers for Disease Control and Prevention (CDC): Call 5-517.628.9271 (1-800-CDC-INFO) or  Visit CDCs website at www.cdc.gov/vaccines. Vaccine Information Statement   Tdap (Tetanus, Diphtheria, Pertussis) Vaccine  8/6/2021  42 FIORELLA Avila [id number]   Department of Health and Human Services  Centers for Disease Control and Prevention    Office Use Only

## 2023-02-09 ENCOUNTER — ROUTINE PRENATAL (OUTPATIENT)
Dept: OBGYN CLINIC | Age: 28
End: 2023-02-09

## 2023-02-09 ENCOUNTER — HOSPITAL ENCOUNTER (OUTPATIENT)
Dept: PERINATAL CARE | Age: 28
Discharge: HOME | End: 2023-02-09
Attending: OBSTETRICS & GYNECOLOGY
Payer: OTHER GOVERNMENT

## 2023-02-09 VITALS
SYSTOLIC BLOOD PRESSURE: 113 MMHG | WEIGHT: 209 LBS | DIASTOLIC BLOOD PRESSURE: 76 MMHG | HEIGHT: 64 IN | HEART RATE: 97 BPM | BODY MASS INDEX: 35.68 KG/M2

## 2023-02-09 DIAGNOSIS — Z34.00 PRENATAL CARE OF PRIMIGRAVIDA, ANTEPARTUM: ICD-10-CM

## 2023-02-09 DIAGNOSIS — Z3A.28 28 WEEKS GESTATION OF PREGNANCY: ICD-10-CM

## 2023-02-09 DIAGNOSIS — F41.9 ANXIETY: Primary | ICD-10-CM

## 2023-02-09 DIAGNOSIS — Z23 ENCOUNTER FOR IMMUNIZATION: ICD-10-CM

## 2023-02-09 LAB — GTT 60 MIN, EXTERNAL: 77

## 2023-02-09 PROCEDURE — 76816 OB US FOLLOW-UP PER FETUS: CPT | Performed by: OBSTETRICS & GYNECOLOGY

## 2023-02-09 RX ORDER — BUPROPION HYDROCHLORIDE 300 MG/1
300 TABLET ORAL DAILY
Qty: 90 TABLET | Refills: 1 | Status: SHIPPED | OUTPATIENT
Start: 2023-02-09 | End: 2023-05-10

## 2023-02-09 NOTE — PROGRESS NOTES
[facility]  [email or web address]  [phone]  Jayleen Guidry MD     Follow-up OB visit    Patient Active Problem List    Diagnosis Date Noted    Prenatal care of primigravida, antepartum 10/06/2022       Vitals:    23 0838   BP: 113/76   Pulse: 97   Weight: 209 lb (94.8 kg)   Height: 5' 4\" (1.626 m)     See PN flowsheet for exam    32 y.o.  28w0d   Routine prenatal visit  Encounter Diagnoses   Name Primary? Encounter for immunization Yes    Prenatal care of primigravida, antepartum         [] SAB/bleeding precautons reviewed  EPDS   [x] PTL/PPROM precautions reviewed   [] Labor precautions reviewed   [] Fetal kick counts discussed   [] Labs reviewed with patient   [] Yuriy Fisher precautions reviewed   [x] Consent reviewed  Disc FSE/IUPC use in labor   [] Handouts given to pt   [] Glucola handout    [] GBS/labor/Magic Hour handout   []    [] We reviewed CDC recommendations for Tdap for patient and close contacts and RBA of receiving in pregnancy, advised obtaining in third trimester   [] Reviewed healthy nutrition in pregnancy and good exercise practices   [] We disc safer medications in pregnancy and referred patient to [facility] resources   [] We reviewed CDC recommendations for flu vaccine and RBA of receiving in pregnancy   []    []    []       Follow-up and Dispositions    Return for Follow up OB visit.          Orders Placed This Encounter    AR IMMUNIZ ADMIN,1 SINGLE/COMB VAC/TOXOID    Tetanus, diphtheria toxoids and acellular pertussis (TDAP) vaccine, in individuals >=7 years, IM    CBC W/O DIFF    GLUCOSE, GESTATIONAL 1 HR TOLERANCE         Jayleen Guidry MD

## 2023-02-09 NOTE — PROGRESS NOTES
[facility]  [email or web address]  [phone]    Elham Richardson MD, [address]       OB/GYN Problem visit      HPI  Rohini Vences is a , 32 y.o. female who presents for a problem visit. Chief Complaint   Patient presents with    Routine Prenatal Visit       Pt co worsening anxiety. She reports her sx are better on wellbutrin but felt better on 300mg in past.  She does not have a counselor. Sexual history and Contraception:  Social History     Substance and Sexual Activity   Sexual Activity Yes    Partners: Male       Past Medical History:   Diagnosis Date    Pap smear for cervical cancer screening 10/06/2022    normal; hpv tested     Current Outpatient Medications   Medication Sig    buPROPion XL (WELLBUTRIN XL) 300 mg XL tablet Take 1 Tablet by mouth daily for 90 days. PNV Comb #2-Iron-FA-Omega 3 29-1-400 mg cmpk Take  by mouth.    calcium/mag/vitamin D2/Zn/min (JEF-MAG ZINC II PO) Take  by mouth.    busPIRone (BUSPAR) 7.5 mg tablet Take 7.5 mg by mouth as needed. (Patient not taking: No sig reported)     No current facility-administered medications for this visit. OB History    Para Term  AB Living   1             SAB IAB Ectopic Molar Multiple Live Births                    # Outcome Date GA Lbr John Paul/2nd Weight Sex Delivery Anes PTL Lv   1 Current              History reviewed. No pertinent surgical history.   Family History   Problem Relation Age of Onset    Thyroid Disease Mother         hypo    Diabetes Father     Hypertension Father     Multiple myeloma Maternal Grandfather      Social History     Socioeconomic History    Marital status:      Spouse name: Not on file    Number of children: Not on file    Years of education: Not on file    Highest education level: Not on file   Occupational History    Not on file   Tobacco Use    Smoking status: Never    Smokeless tobacco: Never   Vaping Use    Vaping Use: Never used   Substance and Sexual Activity    Alcohol use: Not Currently    Drug use: Never    Sexual activity: Yes     Partners: Male   Other Topics Concern    Not on file   Social History Narrative    Not on file     Social Determinants of Health     Financial Resource Strain: Not on file   Food Insecurity: Not on file   Transportation Needs: Not on file   Physical Activity: Not on file   Stress: Not on file   Social Connections: Not on file   Intimate Partner Violence: Not on file   Housing Stability: Not on file     Tobacco History:  reports that she has never smoked. She has never used smokeless tobacco.  Alcohol Abuse:  reports that she does not currently use alcohol. Drug Abuse:  reports no history of drug use. No Known Allergies    Patient Active Problem List   Diagnosis Code    Prenatal care of primigravida, antepartum Z34.00             Review of Systems: History obtained from the patient  Constitutional: see HPI  Breast: negative for breast lumps, nipple discharge, galactorrhea  GI: negative for change in bowel habits, abdominal pain, black or bloody stools  : see HPI   MSK: negative for back pain, joint pain, muscle pain  Skin: negative for itching, rash, hives  Psych: negative for anxiety, depression, change in mood      Objective:  Visit Vitals  /76   Pulse 97   Ht 5' 4\" (1.626 m)   Wt 209 lb (94.8 kg)   LMP 07/28/2022   BMI 35.87 kg/m²       PHYSICAL EXAMINATION:  GENERAL: alert, well appearing, and in no distress  HEAD: normocephalic, atraumatic. ABDOMEN: soft, nontender, nondistended, no masses or organomegaly, gravid  NEURO: alert, grossly oriented to place and time, normal speech,       ASSESSMENT:    ICD-10-CM ICD-9-CM    1. Anxiety  F41.9 300.00       2. Encounter for immunization  Z23 V03.89 TDAP, BOOSTRIX, (AGE 10 YRS+), IM      ME IMMUNIZ ADMIN,1 SINGLE/COMB VAC/TOXOID      3.  Prenatal care of primigravida, antepartum  Z34.00 V22.0 TDAP, BOOSTRIX, (AGE 10 YRS+), IM      CBC W/O DIFF      GLUCOSE, GESTATIONAL 1 HR TOLERANCE      FL IMMUNIZ ADMIN,1 SINGLE/COMB VAC/TOXOID      GLUCOSE, GESTATIONAL 1 HR TOLERANCE      CBC W/O DIFF      4. 28 weeks gestation of pregnancy  Z3A.28 V22.2           PLAN:  Orders Placed This Encounter    FL IMMUNIZ ADMIN,1 SINGLE/COMB VAC/TOXOID    Tetanus, diphtheria toxoids and acellular pertussis (TDAP) vaccine, in individuals >=7 years, IM    CBC W/O DIFF     Standing Status:   Future     Number of Occurrences:   1     Standing Expiration Date:   2024    GLUCOSE, GESTATIONAL 1 HR TOLERANCE     Standing Status:   Future     Number of Occurrences:   1     Standing Expiration Date:   2024    buPROPion XL (WELLBUTRIN XL) 300 mg XL tablet     Sig: Take 1 Tablet by mouth daily for 90 days. Dispense:  90 Tablet     Refill:  1       The patient was instructed to follow up as needed if symptoms persist or worsen. Instructions were given to patient and the patient was given the opportunity to ask any questions concerning the visit today. The patient should keep/make her routine well woman exam.  The patient should contact our office with any questions or concerns. Disc rba of SSRI in pregnancy and balancing medication exposure vs a sub-therapeutic dosing of wellbutrin  Disc risks of pp depression    We discussed that untreated or undertreated depression and anxiety is a significant maternal risk factor in pregnancy. Women with depression are less likely to care for themselves during pregnancy and are more likely to have fetal growth restriction and  delivery. We also discussed the risk of poor maternal infant bonding. Additionally, those women are at significantly higher risk for severe postpartum depression. Antidepressants are safe to use while breastfeeding. Women who take antidepressants are more likely to successfully breastfeed that women who don't. The only known risk to the baby is \"jitteriness\" shortly after birth.  We discussed that the benefits of antidepressant use in mom outweighs the small risk to the fetus. Today, 02/09/23, I personally spent more than 10 minutes in review of records, documentation,  and face to face counseling with the patient discussing the diagnosis, plan of care and importance of compliance with the treatment plan and performing an exam as well as ordering any appropropriate labs, procedures, or medications. No results found for this visit on 02/09/23.

## 2023-02-10 LAB
ERYTHROCYTE [DISTWIDTH] IN BLOOD BY AUTOMATED COUNT: 14.1 % (ref 11.5–14.5)
GLUCOSE 1H P 100 G GLC PO SERPL-MCNC: 77 MG/DL (ref 65–140)
HCT VFR BLD AUTO: 38.5 % (ref 35–47)
HGB BLD-MCNC: 11.9 G/DL (ref 11.5–16)
MCH RBC QN AUTO: 29.8 PG (ref 26–34)
MCHC RBC AUTO-ENTMCNC: 30.9 G/DL (ref 30–36.5)
MCV RBC AUTO: 96.3 FL (ref 80–99)
NRBC # BLD: 0 K/UL (ref 0–0.01)
NRBC BLD-RTO: 0 PER 100 WBC
PLATELET # BLD AUTO: 301 K/UL (ref 150–400)
PMV BLD AUTO: 11.4 FL (ref 8.9–12.9)
RBC # BLD AUTO: 4 M/UL (ref 3.8–5.2)
WBC # BLD AUTO: 16 K/UL (ref 3.6–11)

## 2023-02-11 NOTE — PROGRESS NOTES
Normal glucola/labs  Update PNL/chart  Can discuss at nv or notify pt. [address] message sent if active.

## 2023-02-13 ENCOUNTER — OFFICE VISIT (OUTPATIENT)
Dept: CARDIOLOGY CLINIC | Age: 28
End: 2023-02-13
Payer: OTHER GOVERNMENT

## 2023-02-13 VITALS
BODY MASS INDEX: 35.75 KG/M2 | SYSTOLIC BLOOD PRESSURE: 124 MMHG | WEIGHT: 209.4 LBS | HEART RATE: 108 BPM | HEIGHT: 64 IN | OXYGEN SATURATION: 98 % | DIASTOLIC BLOOD PRESSURE: 82 MMHG

## 2023-02-13 DIAGNOSIS — R00.0 TACHYCARDIA: ICD-10-CM

## 2023-02-13 DIAGNOSIS — R00.2 PALPITATIONS: Primary | ICD-10-CM

## 2023-02-13 DIAGNOSIS — Z76.89 ENCOUNTER TO ESTABLISH CARE: ICD-10-CM

## 2023-02-13 PROCEDURE — 99204 OFFICE O/P NEW MOD 45 MIN: CPT | Performed by: INTERNAL MEDICINE

## 2023-02-13 PROCEDURE — 93000 ELECTROCARDIOGRAM COMPLETE: CPT | Performed by: INTERNAL MEDICINE

## 2023-02-13 RX ORDER — GUAIFENESIN 100 MG/5ML
81 LIQUID (ML) ORAL DAILY
COMMUNITY

## 2023-02-13 NOTE — PROGRESS NOTES
Chief Complaint   Patient presents with    New Patient    Referral / Consult     Tachy  By Edgar Stafford        Vitals:    02/13/23 1020   BP: 124/82   Pulse: (!) 108   Height: 5' 4\" (1.626 m)   Weight: 209 lb 6.4 oz (95 kg)   SpO2: 98%         Chest pain: no    SOB: no    Palpitations: no    Dizziness: no    Swelling: no    Refills:       Have you been to the ER, urgent care clinic since your last visit? Hospitalized since your last visit? no    Have you seen or consulted other health care providers outside of the [facility] since your last visit?  (Include any pap smears or colon screening.)        No pcp at this time New to area

## 2023-02-13 NOTE — PROGRESS NOTES
Per Dr. Dalia Wallace NP- echo and follow up. 48 hr holter applied in office- [id number] instructions given on use and return.  (Sent to monitor team)

## 2023-02-13 NOTE — PROGRESS NOTES
Tiara Sweeney MD, MS, [address] [facility]            HISTORY OF PRESENT ILLNESS:    Carole Amaya is a 32 y.o. female referred by Dr. Román Cano for tachycardia and palpitations. She is 28 weeks pregnant with her first child, boy. Had an episode in Dec where she was traveling and felt like she could not breathe. She felt better once she got up to walk. Has a couple episodes of palpations/tachycardia even before pregnancy. Can not find triggers. Has anxiety. She feels she is adequately hydrating. FH- mom HTN, dad HTN and MI 2 years ago    Denies chest pain, edema, syncope, shortness of breath at rest, dyspnea on exertion, PND or orthopnea. EKG shows sinus tachycardia. Discussed 48h holter (placed today in clinic), echo + FU. SUMMARY:   Problem List  Date Reviewed: 2/9/2023            Codes Class Noted    Palpitations ICD-10-CM: R00.2  ICD-9-CM: 785.1  2/13/2023        Prenatal care of primigravida, antepartum ICD-10-CM: Z34.00  ICD-9-CM: V22.0  10/6/2022    Overview Addendum 2/12/2023  3:24 PM by Fidel Ireland MD     Fh gregg anom: fs, fu 24 wks anatomy   Fh cf fob  Constipation/hemorroids  Anxiety depression on buspar, d/c wellbutrin: EPDS w glucola  EPDS NV  10/6/2022  Initial prenatal done  NIPS low risk  Horizon low risk 4/4 11/16/22 flu shot given  MFM rec ASA, post plac, fu 4wk  81 mg asa 12/28/2022   Palp/tachy: cbc/tsh/cards  12/28/22 - normal prenatal results   Post plac , CI not seen, mfm fu 4wk growth, fu prn  Requests tub room  Lives 45 min away                Current Outpatient Medications on File Prior to Visit   Medication Sig    aspirin 81 mg chewable tablet Take 81 mg by mouth daily. buPROPion XL (WELLBUTRIN XL) 300 mg XL tablet Take 1 Tablet by mouth daily for 90 days. PNV Comb #2-Iron-FA-Omega 3 29-1-400 mg cmpk Take  by mouth.    calcium/mag/vitamin D2/Zn/min (JEF-MAG ZINC II PO) Take  by mouth.    busPIRone (BUSPAR) 7.5 mg tablet Take 7.5 mg by mouth as needed.  (Patient not taking: No sig reported)     No current facility-administered medications on file prior to visit. CARDIOLOGY STUDIES TO DATE:  No results found for this visit on 02/13/23. CARDIAC ROS:   positive for palpitations    Past Medical History:   Diagnosis Date    Pap smear for cervical cancer screening 10/06/2022    normal; hpv tested       Family History   Problem Relation Age of Onset    Thyroid Disease Mother         hypo    Diabetes Father     Hypertension Father     Multiple myeloma Maternal Grandfather        Social History     Socioeconomic History    Marital status:      Spouse name: Not on file    Number of children: Not on file    Years of education: Not on file    Highest education level: Not on file   Occupational History    Not on file   Tobacco Use    Smoking status: Never    Smokeless tobacco: Never   Vaping Use    Vaping Use: Never used   Substance and Sexual Activity    Alcohol use: Not Currently    Drug use: Never    Sexual activity: Yes     Partners: Male   Other Topics Concern    Not on file   Social History Narrative    Not on file     Social Determinants of Health     Financial Resource Strain: Not on file   Food Insecurity: Not on file   Transportation Needs: Not on file   Physical Activity: Not on file   Stress: Not on file   Social Connections: Not on file   Intimate Partner Violence: Not on file   Housing Stability: Not on file        GENERAL ROS:  A comprehensive review of systems was negative except for that written in the HPI.     Visit Vitals  /82   Pulse (!) 108   Ht 5' 4\" (1.626 m)   Wt 95 kg (209 lb 6.4 oz)   LMP 07/28/2022   SpO2 98%   BMI 35.94 kg/m²     Vitals:    02/13/23 1020   BP: 124/82   Pulse: (!) 108   SpO2: 98%   Weight: 95 kg (209 lb 6.4 oz)   Height: 5' 4\" (1.626 m)        Wt Readings from Last 3 Encounters:   02/13/23 95 kg (209 lb 6.4 oz)   02/09/23 94.8 kg (209 lb)   01/26/23 92.8 kg (204 lb 9.6 oz)            BP Readings from Last 3 Encounters:   02/13/23 124/82   02/09/23 113/76   01/26/23 116/81       PHYSICAL EXAM  General appearance: alert, cooperative, no distress, appears stated age  Neck: supple, symmetrical, trachea midline, no adenopathy, thyroid: not enlarged, symmetric, no tenderness/mass/nodules, no carotid bruit, and no JVD  Lungs: clear to auscultation bilaterally  Heart: regular rate and rhythm, S1, S2 normal, no murmur, click, rub or gallop  Extremities: extremities normal, atraumatic, no cyanosis or edema    No results found for: CHOL, CHOLX, CHLST, CHOLV, 002303, HDL, HDLP, LDL, LDLC, DLDLP, TGLX, TRIGL, TRIGP, CHHD, CHHDX    Lab Results   Component Value Date/Time    WBC 16.0 (H) 02/09/2023 12:21 PM    HGB 11.9 02/09/2023 12:21 PM    HCT 38.5 02/09/2023 12:21 PM    PLATELET 712 56/69/3377 12:21 PM    MCV 96.3 02/09/2023 12:21 PM        No results found for: CHOL, CHOLPOCT, CHOLX, CHLST, CHOLV, HDL, HDLP, LDL, LDLC, DLDLP, TGLX, TRIGL, TRIGP, TGLPOCT, NTGLT, CHHD, CHHDX     ASSESSMENT  Diagnoses and all orders for this visit:    1. Palpitations  -     AMB POC EKG ROUTINE W/ 12 LEADS, INTER & REP  48 hr holter  Echo to rule out cardiac involvement    2. Tachycardia  Holter as above    3. Encounter to establish care       Encounter Diagnoses   Name Primary? Palpitations Yes    Encounter to establish care     Tachycardia      Orders Placed This Encounter    AMB POC EKG ROUTINE W/ 12 LEADS, INTER & REP    aspirin 81 mg chewable tablet         1000 Renee Costa MD  2/13/2023        [address]  [address] [place] [address]  [address]  [phone] (F)    380 [facility]  [address]  [phone] (P)  [phone] (F)    ATTENTION:   This medical record was transcribed using an electronic medical records/speech recognition system. Although proofread, it may and can contain electronic, spelling and other errors. Corrections may be executed at a later time.   Please feel free to contact us for any clarifications as needed.

## 2023-02-14 DIAGNOSIS — Z34.00 PRENATAL CARE OF PRIMIGRAVIDA, ANTEPARTUM: ICD-10-CM

## 2023-02-23 ENCOUNTER — ROUTINE PRENATAL (OUTPATIENT)
Dept: OBGYN CLINIC | Age: 28
End: 2023-02-23
Payer: OTHER GOVERNMENT

## 2023-02-23 VITALS — SYSTOLIC BLOOD PRESSURE: 133 MMHG | WEIGHT: 212.4 LBS | BODY MASS INDEX: 36.46 KG/M2 | DIASTOLIC BLOOD PRESSURE: 82 MMHG

## 2023-02-23 DIAGNOSIS — Z34.00 PRENATAL CARE OF PRIMIGRAVIDA, ANTEPARTUM: Primary | ICD-10-CM

## 2023-02-23 PROCEDURE — 0502F SUBSEQUENT PRENATAL CARE: CPT | Performed by: OBSTETRICS & GYNECOLOGY

## 2023-02-23 NOTE — PROGRESS NOTES
_ [address] [facility]  [email or web address]  [phone]  Jazmyne Giron MD     Follow-up OB visit    Patient Active Problem List    Diagnosis Date Noted    Palpitations 2023    Prenatal care of primigravida, antepartum 10/06/2022       Vitals:    23 0932   BP: 133/82   Weight: 212 lb 6.4 oz (96.3 kg)     See PN flowsheet for exam    32 y.o.  30w0d   Routine prenatal visit  Encounter Diagnosis   Name Primary? Prenatal care of primigravida, antepartum Yes     Disc call coverage  Reviewed from last visit  Disc using support belt prn   [] SAB/bleeding precautions reviewed   [x] PTL/PPROM precautions reviewed   [] Labor precautions reviewed   [] Fetal kick counts discussed   [] Labs reviewed with patient   [] Ladi Lezama precautions reviewed   [] Consent reviewed   [] Handouts given to pt   [] Glucola handout    [] GBS/labor/Magic Hour handout   []    [] We reviewed CDC recommendations for Tdap for patient and close contacts and RBA of receiving in pregnancy, advised obtaining in third trimester   [] Reviewed healthy nutrition in pregnancy and good exercise practices   [] We disc safer medications in pregnancy and referred patient to [facility] ERASTO resources   [] We reviewed CDC recommendations for flu vaccine and RBA of receiving in pregnancy   []    []    []       Follow-up and Dispositions    Return for Follow up OB visit. No orders of the defined types were placed in this encounter.       Jazmyne Giron MD

## 2023-03-01 ENCOUNTER — PATIENT MESSAGE (OUTPATIENT)
Dept: CARDIOLOGY CLINIC | Age: 28
End: 2023-03-01

## 2023-03-09 ENCOUNTER — ROUTINE PRENATAL (OUTPATIENT)
Dept: OBGYN CLINIC | Age: 28
End: 2023-03-09
Payer: OTHER GOVERNMENT

## 2023-03-09 VITALS
SYSTOLIC BLOOD PRESSURE: 125 MMHG | HEIGHT: 64 IN | DIASTOLIC BLOOD PRESSURE: 83 MMHG | BODY MASS INDEX: 36.54 KG/M2 | WEIGHT: 214 LBS | HEART RATE: 105 BPM

## 2023-03-09 DIAGNOSIS — Z34.00 PRENATAL CARE OF PRIMIGRAVIDA, ANTEPARTUM: Primary | ICD-10-CM

## 2023-03-09 PROCEDURE — 0502F SUBSEQUENT PRENATAL CARE: CPT | Performed by: OBSTETRICS & GYNECOLOGY

## 2023-03-09 RX ORDER — FAMOTIDINE 20 MG/1
20 TABLET, FILM COATED ORAL 2 TIMES DAILY
COMMUNITY

## 2023-03-09 NOTE — PROGRESS NOTES
[facility]  [email or web address]  [phone]  Mayuri Deras MD     Follow-up OB visit    Patient Active Problem List    Diagnosis Date Noted    Palpitations 2023    Prenatal care of primigravida, antepartum 10/06/2022       Vitals:    23 0931   BP: 125/83   Pulse: (!) 105   Weight: 214 lb (97.1 kg)   Height: 5' 4\" (1.626 m)     See PN flowsheet for exam  Stable palpitations    32 y.o.  32w0d   Routine prenatal visit  Encounter Diagnosis   Name Primary? Prenatal care of primigravida, antepartum Yes         Disc options of delayed cord clamping and keeping baby warm vs leaving on vernix     [] SAB/bleeding precautions reviewed   [x] PTL/PPROM precautions reviewed   [] Labor precautions reviewed   [] Fetal kick counts discussed   [] Labs reviewed with patient   [] Meghna Sleek precautions reviewed   [] Consent reviewed   [] Handouts given to pt   [] Glucola handout    [] GBS/labor/Magic Hour handout   []    [] We reviewed CDC recommendations for Tdap for patient and close contacts and RBA of receiving in pregnancy, advised obtaining in third trimester   [] Reviewed healthy nutrition in pregnancy and good exercise practices   [] We disc safer medications in pregnancy and referred patient to [facility] ERASTO resources   [] We reviewed CDC recommendations for flu vaccine and RBA of receiving in pregnancy   []    []    []       Follow-up and Dispositions    Return for Follow up OB visit. No orders of the defined types were placed in this encounter.       Mayuri Deras MD

## 2023-03-23 ENCOUNTER — ROUTINE PRENATAL (OUTPATIENT)
Dept: OBGYN CLINIC | Age: 28
End: 2023-03-23

## 2023-03-23 VITALS
DIASTOLIC BLOOD PRESSURE: 83 MMHG | SYSTOLIC BLOOD PRESSURE: 119 MMHG | BODY MASS INDEX: 37.76 KG/M2 | WEIGHT: 221.2 LBS | HEIGHT: 64 IN | HEART RATE: 98 BPM

## 2023-03-23 DIAGNOSIS — Z34.00 PRENATAL CARE OF PRIMIGRAVIDA, ANTEPARTUM: ICD-10-CM

## 2023-03-23 NOTE — PROGRESS NOTES
LIMITED OB SCAN  A SINGLE VERTEX 34W0D IUP IS SEEN. FETAL CARDIAC MOTION OBSERVED. LIMITED ANATOMY WAS VISUALIZED AND APPEARS WNL. APPROPRIATE FETAL GROWTH IS SEEN. SIZE=DATES. PEACE AND PLACENTA APPEAR WITHIN NORMAL LIMITS.

## 2023-03-23 NOTE — PROGRESS NOTES
I discussed with the patient the limitations of ultrasound and that imaging can not rule out all birth defects, chromosomal problems, or other problems with the baby. [facility]  [email or web address]  [phone]  Elham Richardson MD     Follow-up OB visit    Patient Active Problem List    Diagnosis Date Noted    Palpitations 2023    Prenatal care of primigravida, antepartum 10/06/2022       Vitals:    23 0953   BP: 119/83   Pulse: 98   Weight: 221 lb 3.2 oz (100.3 kg)   Height: 5' 4\" (1.626 m)     See PN flowsheet for exam    32 y.o.  34w0d   Routine prenatal visit  Encounter Diagnosis   Name Primary? Prenatal care of primigravida, antepartum         [] SAB/bleeding precautions reviewed   [x] PTL/PPROM precautions reviewed   [] Labor precautions reviewed   [] Fetal kick counts discussed   [] Labs reviewed with patient   [] Brenda Bench precautions reviewed   [] Consent reviewed   [] Handouts given to pt   [] Glucola handout    [] GBS/labor/Magic Hour handout   []    [] We reviewed CDC recommendations for Tdap for patient and close contacts and RBA of receiving in pregnancy, advised obtaining in third trimester   [] Reviewed healthy nutrition in pregnancy and good exercise practices   [] We disc safer medications in pregnancy and referred patient to [facility]   [] We reviewed CDC recommendations for flu vaccine and RBA of receiving in pregnancy   []    []    []           No orders of the defined types were placed in this encounter.       Elham Richardson MD

## 2023-04-03 ENCOUNTER — OFFICE VISIT (OUTPATIENT)
Dept: CARDIOLOGY CLINIC | Age: 28
End: 2023-04-03

## 2023-04-03 ENCOUNTER — ANCILLARY PROCEDURE (OUTPATIENT)
Dept: CARDIOLOGY CLINIC | Age: 28
End: 2023-04-03
Payer: OTHER GOVERNMENT

## 2023-04-03 DIAGNOSIS — Z76.89 ENCOUNTER TO ESTABLISH CARE: ICD-10-CM

## 2023-04-03 DIAGNOSIS — R00.2 PALPITATIONS: Primary | ICD-10-CM

## 2023-04-03 DIAGNOSIS — R00.2 PALPITATIONS: ICD-10-CM

## 2023-04-03 DIAGNOSIS — R00.0 TACHYCARDIA: ICD-10-CM

## 2023-04-03 LAB
ECHO AO ROOT DIAM: 2.9 CM
ECHO AO ROOT INDEX: 1.42 CM/M2
ECHO AV MEAN GRADIENT: 3 MMHG
ECHO AV MEAN VELOCITY: 0.8 M/S
ECHO AV PEAK GRADIENT: 5 MMHG
ECHO AV PEAK VELOCITY: 1.1 M/S
ECHO AV VELOCITY RATIO: 0.73
ECHO AV VTI: 17.9 CM
ECHO LA DIAMETER INDEX: 1.67 CM/M2
ECHO LA DIAMETER: 3.4 CM
ECHO LA TO AORTIC ROOT RATIO: 1.17
ECHO LA VOL 2C: 30 ML (ref 22–52)
ECHO LA VOL 4C: 28 ML (ref 22–52)
ECHO LA VOL BP: 32 ML (ref 22–52)
ECHO LA VOL/BSA BIPLANE: 16 ML/M2 (ref 16–34)
ECHO LA VOLUME AREA LENGTH: 33 ML
ECHO LA VOLUME INDEX A2C: 15 ML/M2 (ref 16–34)
ECHO LA VOLUME INDEX A4C: 14 ML/M2 (ref 16–34)
ECHO LA VOLUME INDEX AREA LENGTH: 16 ML/M2 (ref 16–34)
ECHO LV E' LATERAL VELOCITY: 7 CM/S
ECHO LV EDV A2C: 61 ML
ECHO LV EDV A4C: 75 ML
ECHO LV EDV BP: 69 ML (ref 56–104)
ECHO LV EDV INDEX A4C: 37 ML/M2
ECHO LV EDV INDEX BP: 34 ML/M2
ECHO LV EDV NDEX A2C: 30 ML/M2
ECHO LV EJECTION FRACTION A2C: 47 %
ECHO LV EJECTION FRACTION A4C: 63 %
ECHO LV EJECTION FRACTION BIPLANE: 56 % (ref 55–100)
ECHO LV ESV A2C: 33 ML
ECHO LV ESV A4C: 28 ML
ECHO LV ESV BP: 30 ML (ref 19–49)
ECHO LV ESV INDEX A2C: 16 ML/M2
ECHO LV ESV INDEX A4C: 14 ML/M2
ECHO LV ESV INDEX BP: 15 ML/M2
ECHO LV FRACTIONAL SHORTENING: 35 % (ref 28–44)
ECHO LV INTERNAL DIMENSION DIASTOLE INDEX: 2.25 CM/M2
ECHO LV INTERNAL DIMENSION DIASTOLIC: 4.6 CM (ref 3.9–5.3)
ECHO LV INTERNAL DIMENSION SYSTOLIC INDEX: 1.47 CM/M2
ECHO LV INTERNAL DIMENSION SYSTOLIC: 3 CM
ECHO LV IVSD: 0.9 CM (ref 0.6–0.9)
ECHO LV MASS 2D: 137.7 G (ref 67–162)
ECHO LV MASS INDEX 2D: 67.5 G/M2 (ref 43–95)
ECHO LV POSTERIOR WALL DIASTOLIC: 0.9 CM (ref 0.6–0.9)
ECHO LV RELATIVE WALL THICKNESS RATIO: 0.39
ECHO LVOT AV VTI INDEX: 0.66
ECHO LVOT MEAN GRADIENT: 1 MMHG
ECHO LVOT PEAK GRADIENT: 3 MMHG
ECHO LVOT PEAK VELOCITY: 0.8 M/S
ECHO LVOT VTI: 11.8 CM
ECHO RV FREE WALL PEAK S': 11 CM/S
ECHO RV INTERNAL DIMENSION: 3.6 CM
ECHO RV TAPSE: 1.6 CM (ref 1.7–?)

## 2023-04-03 PROCEDURE — 99214 OFFICE O/P EST MOD 30 MIN: CPT | Performed by: INTERNAL MEDICINE

## 2023-04-03 PROCEDURE — 93306 TTE W/DOPPLER COMPLETE: CPT | Performed by: INTERNAL MEDICINE

## 2023-04-03 NOTE — PROGRESS NOTES
Jeff Maxwell MD, MS, [address] [facility]            HISTORY OF PRESENT ILLNESS:    Akash Artis is a 29 y.o. female referred by Dr. Ruthann Khoury for tachycardia and palpitations. She is 28 weeks pregnant with her first child, boy. Had an episode in Dec where she was traveling and felt like she could not breathe. She felt better once she got up to walk. Has a couple episodes of palpations/tachycardia even before pregnancy. Can not find triggers. Has anxiety. She feels she is adequately hydrating. FH- mom HTN, dad HTN and MI 2 years ago    Denies chest pain, edema, syncope, shortness of breath at rest, dyspnea on exertion, PND or orthopnea. EKG shows sinus tachycardia. Discussed 48h holter (placed today in clinic), echo + FU. Echo today shows normal LV fxn, EF 60%, no valve disease. Holter NSR, no arrhythmia, average HR 94, ranged from . Feels SOB - due early May. SUMMARY:   Problem List  Date Reviewed: 4/3/2023            Codes Class Noted    Palpitations ICD-10-CM: R00.2  ICD-9-CM: 785.1  2/13/2023        Prenatal care of primigravida, antepartum ICD-10-CM: Z34.00  ICD-9-CM: V22.0  10/6/2022    Overview Addendum 3/23/2023 10:11 AM by Erma Victor MD      gregg anom: fs, fu 24 wks anatomy   Fh cf fob  Constipation/hemorroids  Anxiety depression on buspar, d/c wellbutrin: EPDS w glucola  EPDS NV  10/6/2022  Initial prenatal done  NIPS low risk  Horizon low risk 4/4 11/16/22 flu shot given  MFM rec ASA, post plac, fu 4wk  81 mg asa 12/28/2022   Palp/tachy: cbc/tsh/cards  12/28/22 - normal prenatal results   2/9/23 - normal 1hr gtt 68  Post plac , CI not seen, mfm fu 4wk growth, fu prn  Requests tub room  Lives 45 min away  Prefers to avoid cx exams                Current Outpatient Medications on File Prior to Visit   Medication Sig    famotidine (PEPCID) 20 mg tablet Take 20 mg by mouth two (2) times a day. aspirin 81 mg chewable tablet Take 81 mg by mouth daily.     buPROPion XL (WELLBUTRIN XL) 300 mg XL tablet Take 1 Tablet by mouth daily for 90 days. PNV Comb #2-Iron-FA-Omega 3 29-1-400 mg cmpk Take  by mouth.    calcium/mag/vitamin D2/Zn/min (JEF-MAG ZINC II PO) Take  by mouth.    busPIRone (BUSPAR) 7.5 mg tablet Take 7.5 mg by mouth as needed. (Patient not taking: No sig reported)     No current facility-administered medications on file prior to visit. CARDIOLOGY STUDIES TO DATE:  No results found for this visit on 04/03/23. CARDIAC ROS:   positive for palpitations    Past Medical History:   Diagnosis Date    Pap smear for cervical cancer screening 10/06/2022    normal; hpv tested       Family History   Problem Relation Age of Onset    Thyroid Disease Mother         hypo    Diabetes Father     Hypertension Father     Multiple myeloma Maternal Grandfather        Social History     Socioeconomic History    Marital status:      Spouse name: Not on file    Number of children: Not on file    Years of education: Not on file    Highest education level: Not on file   Occupational History    Not on file   Tobacco Use    Smoking status: Never    Smokeless tobacco: Never   Vaping Use    Vaping Use: Never used   Substance and Sexual Activity    Alcohol use: Not Currently    Drug use: Never    Sexual activity: Yes     Partners: Male   Other Topics Concern    Not on file   Social History Narrative    Not on file     Social Determinants of Health     Financial Resource Strain: Not on file   Food Insecurity: Not on file   Transportation Needs: Not on file   Physical Activity: Not on file   Stress: Not on file   Social Connections: Not on file   Intimate Partner Violence: Not on file   Housing Stability: Not on file        GENERAL ROS:  A comprehensive review of systems was negative except for that written in the HPI.     Visit Vitals  /78 (BP 1 Location: Left upper arm, BP Patient Position: Sitting)   Pulse 98   Ht 5' 4\" (1.626 m)   Wt 99.8 kg (220 lb)   LMP 07/28/2022   SpO2 98%   BMI 37.76 kg/m²     Vitals:    04/03/23 1409   BP: 116/78   Pulse: 98   SpO2: 98%   Weight: 99.8 kg (220 lb)   Height: 5' 4\" (1.626 m)        Wt Readings from Last 3 Encounters:   04/03/23 99.8 kg (220 lb)   04/03/23 99.8 kg (220 lb)   03/23/23 100.3 kg (221 lb 3.2 oz)            BP Readings from Last 3 Encounters:   04/03/23 116/78   04/03/23 116/78   03/23/23 119/83       PHYSICAL EXAM  General appearance: alert, cooperative, no distress, appears stated age  Neck: supple, symmetrical, trachea midline, no adenopathy, thyroid: not enlarged, symmetric, no tenderness/mass/nodules, no carotid bruit, and no JVD  Lungs: clear to auscultation bilaterally  Heart: regular rate and rhythm, S1, S2 normal, no murmur, click, rub or gallop  Extremities: extremities normal, atraumatic, no cyanosis or edema    No results found for: CHOL, CHOLX, CHLST, CHOLV, 754889, HDL, HDLP, LDL, LDLC, DLDLP, TGLX, TRIGL, TRIGP, CHHD, CHHDX    Lab Results   Component Value Date/Time    WBC 16.0 (H) 02/09/2023 12:21 PM    HGB 11.9 02/09/2023 12:21 PM    HCT 38.5 02/09/2023 12:21 PM    PLATELET 890 99/18/2858 12:21 PM    MCV 96.3 02/09/2023 12:21 PM        No results found for: CHOL, CHOLPOCT, CHOLX, CHLST, CHOLV, HDL, HDLP, LDL, LDLC, DLDLP, TGLX, TRIGL, TRIGP, TGLPOCT, NTGLT, CHHD, CHHDX     ASSESSMENT  Diagnoses and all orders for this visit:    1. Palpitations  Echo normal LV function. 2. Tachycardia  Holter NSR. 3. Encounter to establish care           Eduardo Joseph MD  4/3/2023        [address] [place]   [address] [facility] [address]  [place], [address] [place] [address] [address]  [phone] (F)    [address], [id number] [place] [address] [address]  [phone] (P)  [phone] (F)    ATTENTION:   This medical record was transcribed using an electronic medical records/speech recognition system. Although proofread, it may and can contain electronic, spelling and other errors. Corrections may be executed at a later time. Please feel free to contact us for any clarifications as needed.

## 2023-04-03 NOTE — PROGRESS NOTES
Chief Complaint   Patient presents with    Follow-up     Echo by Rama Kerr today. Palpitations     Tachy     Other     Heart Monitor result.       Vitals:    04/03/23 1409   BP: 116/78   BP 1 Location: Left upper arm   BP Patient Position: Sitting   Pulse: 98   Height: 5' 4\" (1.626 m)   Weight: 220 lb (99.8 kg)   SpO2: 98%       Chest pain denied     SOB denied     Palpitations denied     Swelling in hands/feet denied     Dizziness denied     Recent hospital stays denied     Refills denied

## 2023-04-06 ENCOUNTER — ROUTINE PRENATAL (OUTPATIENT)
Dept: OBGYN CLINIC | Age: 28
End: 2023-04-06
Payer: OTHER GOVERNMENT

## 2023-04-06 PROCEDURE — 0502F SUBSEQUENT PRENATAL CARE: CPT | Performed by: OBSTETRICS & GYNECOLOGY

## 2023-04-06 NOTE — PROGRESS NOTES
Problem List  Date Reviewed: 4/3/2023          Codes Class Noted    Palpitations ICD-10-CM: R00.2  ICD-9-CM: 785.1  2/13/2023        Prenatal care of primigravida, antepartum ICD-10-CM: Z34.00  ICD-9-CM: V22.0  10/6/2022    Overview Addendum 3/23/2023 10:11 AM by Eunice Russell MD     Fh gregg anom: fs, fu 24 wks anatomy   Fh cf fob  Constipation/hemorroids  Anxiety depression on buspar, d/c wellbutrin: EPDS w glucola  EPDS NV  10/6/2022  Initial prenatal done  NIPS low risk  Horizon low risk 4/4 11/16/22 flu shot given  MFM rec ASA, post plac, fu 4wk  81 mg asa 12/28/2022   Palp/tachy: cbc/tsh/cards  12/28/22 - normal prenatal results   2/9/23 - normal 1hr gtt 77  Post plac , CI not seen, mfm fu 4wk growth, fu prn  Requests tub room  Lives 45 min away  Prefers to avoid cx exams

## 2023-04-20 ENCOUNTER — ROUTINE PRENATAL (OUTPATIENT)
Dept: OBGYN CLINIC | Age: 28
End: 2023-04-20
Payer: OTHER GOVERNMENT

## 2023-04-20 VITALS
SYSTOLIC BLOOD PRESSURE: 129 MMHG | HEIGHT: 64 IN | DIASTOLIC BLOOD PRESSURE: 86 MMHG | BODY MASS INDEX: 39.61 KG/M2 | WEIGHT: 232 LBS | HEART RATE: 103 BPM

## 2023-04-20 DIAGNOSIS — Z34.00 PRENATAL CARE OF PRIMIGRAVIDA, ANTEPARTUM: Primary | ICD-10-CM

## 2023-04-20 PROCEDURE — 0502F SUBSEQUENT PRENATAL CARE: CPT | Performed by: OBSTETRICS & GYNECOLOGY

## 2023-04-20 NOTE — PROGRESS NOTES
[facility]  [email or web address]  [phone]  Tamara Poole MD     Follow-up OB visit    Patient Active Problem List    Diagnosis Date Noted    Palpitations 2023    Prenatal care of primigravida, antepartum 10/06/2022       Vitals:    23 0921   BP: 129/86   Pulse: (!) 103   Weight: 232 lb (105.2 kg)   Height: 5' 4\" (1.626 m)     See PN flowsheet for exam    29 y.o.  38w0d   Routine prenatal visit  Encounter Diagnosis   Name Primary? Prenatal care of primigravida, antepartum Yes     Declines cx exam   [] SAB/bleeding precautions reviewed   [] PTL/PPROM precautions reviewed   [x] Labor precautions reviewed   [x] Fetal kick counts discussed   [] Labs reviewed with patient   [] [place] precautions reviewed   [] Consent reviewed   [] Handouts given to pt   [] Glucola handout    [] GBS/labor/Magic Hour handout   []    [] We reviewed CDC recommendations for Tdap for patient and close contacts and RBA of receiving in pregnancy, advised obtaining in third trimester   [] Reviewed healthy nutrition in pregnancy and good exercise practices   [] We disc safer medications in pregnancy and referred patient to [facility] ERASTO resources   [] We reviewed CDC recommendations for flu vaccine and RBA of receiving in pregnancy   []    []    []           No orders of the defined types were placed in this encounter.       Tamara Poole MD

## 2023-04-27 ENCOUNTER — ROUTINE PRENATAL (OUTPATIENT)
Dept: OBGYN CLINIC | Age: 28
End: 2023-04-27
Payer: OTHER GOVERNMENT

## 2023-04-27 VITALS — SYSTOLIC BLOOD PRESSURE: 114 MMHG | BODY MASS INDEX: 40.17 KG/M2 | DIASTOLIC BLOOD PRESSURE: 77 MMHG | WEIGHT: 234 LBS

## 2023-04-27 DIAGNOSIS — Z34.00 PRENATAL CARE OF PRIMIGRAVIDA, ANTEPARTUM: Primary | ICD-10-CM

## 2023-04-27 PROCEDURE — 0502F SUBSEQUENT PRENATAL CARE: CPT | Performed by: OBSTETRICS & GYNECOLOGY

## 2023-04-27 NOTE — PROGRESS NOTES
[facility]  [email or web address]  [phone]  Yosvany Mina MD     Follow-up OB visit    Patient Active Problem List    Diagnosis Date Noted    Palpitations 2023    Prenatal care of primigravida, antepartum 10/06/2022       Vitals:    23 0930   BP: 114/77   Weight: 234 lb (106.1 kg)     See PN flowsheet for exam    29 y.o.  39w0d   Routine prenatal visit  Encounter Diagnosis   Name Primary? Prenatal care of primigravida, antepartum Yes     Disc rba of IOL, pt is amenable to IOL ~ 41 wks  Disc r/b/a of induction and pitocin use and increase risk of longer labor,  section, bleeding and infection. [] SAB/bleeding precautions reviewed   [] PTL/PPROM precautions reviewed   [x] Labor precautions reviewed   [] Fetal kick counts discussed   [] Labs reviewed with patient   [] Roann List precautions reviewed   [] Consent reviewed   [] Handouts given to pt   [] Glucola handout    [] GBS/labor/Magic Hour handout   []    [] We reviewed CDC recommendations for Tdap for patient and close contacts and RBA of receiving in pregnancy, advised obtaining in third trimester   [] Reviewed healthy nutrition in pregnancy and good exercise practices   [] We disc safer medications in pregnancy and referred patient to [facility] ERASTO resources   [] We reviewed CDC recommendations for flu vaccine and RBA of receiving in pregnancy   []    []    []       Follow-up and Dispositions    Return for Follow up OB visit. No orders of the defined types were placed in this encounter.       Yosvany Mina MD

## 2023-05-01 ENCOUNTER — HOSPITAL ENCOUNTER (EMERGENCY)
Age: 28
Discharge: HOME | End: 2023-05-01
Attending: OBSTETRICS & GYNECOLOGY | Admitting: OBSTETRICS & GYNECOLOGY
Payer: OTHER GOVERNMENT

## 2023-05-01 ENCOUNTER — ROUTINE PRENATAL (OUTPATIENT)
Dept: OBGYN CLINIC | Age: 28
End: 2023-05-01
Payer: OTHER GOVERNMENT

## 2023-05-01 VITALS
TEMPERATURE: 97.7 F | OXYGEN SATURATION: 99 % | WEIGHT: 239 LBS | HEART RATE: 86 BPM | SYSTOLIC BLOOD PRESSURE: 106 MMHG | RESPIRATION RATE: 16 BRPM | HEIGHT: 64 IN | BODY MASS INDEX: 40.8 KG/M2 | DIASTOLIC BLOOD PRESSURE: 64 MMHG

## 2023-05-01 VITALS
HEIGHT: 64 IN | DIASTOLIC BLOOD PRESSURE: 91 MMHG | BODY MASS INDEX: 40.9 KG/M2 | SYSTOLIC BLOOD PRESSURE: 127 MMHG | WEIGHT: 239.6 LBS | HEART RATE: 112 BPM

## 2023-05-01 DIAGNOSIS — Z34.00 PRENATAL CARE OF PRIMIGRAVIDA, ANTEPARTUM: Primary | ICD-10-CM

## 2023-05-01 DIAGNOSIS — O16.3 ELEVATED BLOOD PRESSURE AFFECTING PREGNANCY IN THIRD TRIMESTER, ANTEPARTUM: ICD-10-CM

## 2023-05-01 LAB
ALBUMIN SERPL-MCNC: 2.6 G/DL (ref 3.5–5)
ALBUMIN/GLOB SERPL: 0.7 (ref 1.1–2.2)
ALP SERPL-CCNC: 172 U/L (ref 45–117)
ALT SERPL-CCNC: 33 U/L (ref 12–78)
ANION GAP SERPL CALC-SCNC: 3 MMOL/L (ref 5–15)
AST SERPL-CCNC: 26 U/L (ref 15–37)
BASOPHILS # BLD: 0 K/UL (ref 0–0.1)
BASOPHILS NFR BLD: 0 % (ref 0–1)
BILIRUB SERPL-MCNC: 0.4 MG/DL (ref 0.2–1)
BUN SERPL-MCNC: 7 MG/DL (ref 6–20)
BUN/CREAT SERPL: 8 (ref 12–20)
CALCIUM SERPL-MCNC: 8.9 MG/DL (ref 8.5–10.1)
CHLORIDE SERPL-SCNC: 113 MMOL/L (ref 97–108)
CO2 SERPL-SCNC: 23 MMOL/L (ref 21–32)
CREAT SERPL-MCNC: 0.83 MG/DL (ref 0.55–1.02)
CREAT UR-MCNC: 17 MG/DL
DIFFERENTIAL METHOD BLD: ABNORMAL
EOSINOPHIL # BLD: 0.1 K/UL (ref 0–0.4)
EOSINOPHIL NFR BLD: 1 % (ref 0–7)
ERYTHROCYTE [DISTWIDTH] IN BLOOD BY AUTOMATED COUNT: 14.6 % (ref 11.5–14.5)
GLOBULIN SER CALC-MCNC: 3.7 G/DL (ref 2–4)
GLUCOSE SERPL-MCNC: 77 MG/DL (ref 65–100)
HCT VFR BLD AUTO: 35.2 % (ref 35–47)
HGB BLD-MCNC: 11.5 G/DL (ref 11.5–16)
IMM GRANULOCYTES # BLD AUTO: 0.2 K/UL (ref 0–0.04)
IMM GRANULOCYTES NFR BLD AUTO: 1 % (ref 0–0.5)
LYMPHOCYTES # BLD: 1.4 K/UL (ref 0.8–3.5)
LYMPHOCYTES NFR BLD: 12 % (ref 12–49)
MCH RBC QN AUTO: 29.4 PG (ref 26–34)
MCHC RBC AUTO-ENTMCNC: 32.7 G/DL (ref 30–36.5)
MCV RBC AUTO: 90 FL (ref 80–99)
MONOCYTES # BLD: 1 K/UL (ref 0–1)
MONOCYTES NFR BLD: 9 % (ref 5–13)
NEUTS SEG # BLD: 8.5 K/UL (ref 1.8–8)
NEUTS SEG NFR BLD: 77 % (ref 32–75)
NRBC # BLD: 0 K/UL (ref 0–0.01)
NRBC BLD-RTO: 0 PER 100 WBC
PLATELET # BLD AUTO: 248 K/UL (ref 150–400)
PMV BLD AUTO: 11.9 FL (ref 8.9–12.9)
POTASSIUM SERPL-SCNC: 4 MMOL/L (ref 3.5–5.1)
PROT SERPL-MCNC: 6.3 G/DL (ref 6.4–8.2)
PROT UR-MCNC: 5 MG/DL (ref 0–11.9)
PROT/CREAT UR-RTO: 0.3
RBC # BLD AUTO: 3.91 M/UL (ref 3.8–5.2)
SODIUM SERPL-SCNC: 139 MMOL/L (ref 136–145)
WBC # BLD AUTO: 11.2 K/UL (ref 3.6–11)

## 2023-05-01 PROCEDURE — 59025 FETAL NON-STRESS TEST: CPT | Performed by: OBSTETRICS & GYNECOLOGY

## 2023-05-01 PROCEDURE — 84156 ASSAY OF PROTEIN URINE: CPT

## 2023-05-01 PROCEDURE — 80053 COMPREHEN METABOLIC PANEL: CPT

## 2023-05-01 PROCEDURE — 0502F SUBSEQUENT PRENATAL CARE: CPT | Performed by: OBSTETRICS & GYNECOLOGY

## 2023-05-01 PROCEDURE — 36415 COLL VENOUS BLD VENIPUNCTURE: CPT

## 2023-05-01 PROCEDURE — 85025 COMPLETE CBC W/AUTO DIFF WBC: CPT

## 2023-05-01 PROCEDURE — 99213 OFFICE O/P EST LOW 20 MIN: CPT | Performed by: OBSTETRICS & GYNECOLOGY

## 2023-05-01 NOTE — PROGRESS NOTES
[facility]  [email or web address]  [phone]  Jocelin Holley MD     Follow-up OB visit    Patient Active Problem List    Diagnosis Date Noted    Palpitations 2023    Prenatal care of primigravida, antepartum 10/06/2022       Vitals:    23 0926 23 0927   BP: (!) 138/95 (!) 127/91   Pulse: (!) 112 (!) 112   Weight: 239 lb 9.6 oz (108.7 kg)    Height: 5' 4\" (1.626 m)      See PN flowsheet for exam  Good FM  Some floaters when changes head position. PT reports swelling LE increased    Ext no c/t 1+ edema b/l    29 y.o.  39w4d   Routine prenatal visit  No diagnosis found. To LD for PIH labs/nst: pt aware, LD aware  PIH precautions reviewed     [] SAB/bleeding precautions reviewed   [] PTL/PPROM precautions reviewed   [] Labor precautions reviewed   [] Fetal kick counts discussed   [] Labs reviewed with patient   [] [place] precautions reviewed   [] Consent reviewed   [] Handouts given to pt   [] Glucola handout    [] GBS/labor/Magic Hour handout   []    [] We reviewed CDC recommendations for Tdap for patient and close contacts and RBA of receiving in pregnancy, advised obtaining in third trimester   [] Reviewed healthy nutrition in pregnancy and good exercise practices   [] We disc safer medications in pregnancy and referred patient to [facility] ERASTO resources   [] We reviewed CDC recommendations for flu vaccine and RBA of receiving in pregnancy   []    []    []           No orders of the defined types were placed in this encounter.       Jocelin Holley MD

## 2023-05-01 NOTE — H&P
[facility]  [email or web address]  [phone]    Tahir Morales MD, FACOG     Labor and Delivery History & Physical  Template updated     Name: Sera Winston MRN: [id number]  SSN: [id number]    YOB: 1995  Age: 29 y.o. Sex: female        Subjective:     Estimated Date of Delivery: 23  OB History    Para Term  AB Living   1             SAB IAB Ectopic Molar Multiple Live Births                    # Outcome Date GA Lbr John Paul/2nd Weight Sex Delivery Anes PTL Lv   1 Current                    Ms. Jayashree Ayalap is arrived to L and D from the office with a pregnancy at 39w4d for  evaluation for elevated BP . Prenatal course was normal.   Please see prenatal records for details. There are no active hospital problems to display for this patient. Patient Active Problem List   Diagnosis Code    Prenatal care of primigravida, antepartum Z34.00    Palpitations R00.2    Elevated blood pressure affecting pregnancy in third trimester, antepartum O16.3         Patient reports good fetal movement, no loss or gush of fluid, no vaginal bleeding, no significant or increase in vaginal discharge, no significant contractions or abdominal pain. Occ UC. Patient denies chest pain, shortness of breath, right upper quadrant pain, vision changes except some floaters. Past Medical History:   Diagnosis Date    Depression     Pap smear for cervical cancer screening 10/06/2022    normal; hpv tested     No past surgical history on file.   Social History     Socioeconomic History    Marital status:    Tobacco Use    Smoking status: Never    Smokeless tobacco: Never   Vaping Use    Vaping Use: Never used   Substance and Sexual Activity    Alcohol use: Not Currently    Drug use: Never    Sexual activity: Yes     Partners: Male     Family History   Problem Relation Age of Onset    Thyroid Disease Mother         hypo    Diabetes Father Hypertension Father     Multiple myeloma Maternal Grandfather        No Known Allergies  Prior to Admission medications    Medication Sig Start Date End Date Taking? Authorizing Provider   vitamin D3-vitamin K2, MK4, 1,000-100 unit-mcg tab Take  by mouth. Yes Provider, Historical   famotidine (PEPCID) 20 mg tablet Take 1 Tablet by mouth two (2) times a day. Yes Provider, Historical   aspirin 81 mg chewable tablet Take 1 Tablet by mouth daily. Yes Provider, Historical   buPROPion XL (WELLBUTRIN XL) 300 mg XL tablet Take 1 Tablet by mouth daily for 90 days. 2/9/23 5/10/23 Yes Wyatt Leyden, MD   PNV Comb #2-Iron-FA-Omega 3 29-1-400 mg cmpk Take  by mouth. Yes Provider, Historical   calcium/mag/vitamin D2/Zn/min (JEF-MAG ZINC II PO) Take  by mouth. Yes Provider, Historical   busPIRone (BUSPAR) 7.5 mg tablet Take 7.5 mg by mouth as needed. Patient not taking: Reported on 11/16/2022    Provider, Historical        There are no active hospital problems to display for this patient. Review of Systems: A comprehensive review of systems was negative except for that written in the HPI.   Constitutional: negative for fevers, chills and weight loss  ENT ROS: see HPI  Respiratory: negative for cough, wheezing or dyspnea on exertion  Cardiovascular: negative for chest pain, irregular heart beats, exertional chest pressure/discomfort  Gastrointestinal: negative for dysphagia, nausea and vomiting  Genito-Urinary ROS: see HPI  Inteument/breast: negative for rash, breast lump and nipple discharge  Musculoskeletal:negative for stiff joints, neck pain and muscle weakness + LE edema  Endocrine ROS: negative for - breast changes, galactorrhea or temperature intolerance  Hematological and Lymphatic ROS: negative for - bruising or swollen lymph nodes      Objective:     Vitals:  Patient Vitals for the past 4 hrs:   Temp Pulse Resp BP SpO2   05/01/23 1144 -- 88 -- 133/81 --   05/01/23 1129 -- 90 -- 134/87 --   05/01/23 1114 -- 100 -- 126/86 --   05/01/23 1059 -- 100 -- 120/82 --   05/01/23 1044 -- (!) 104 -- 125/81 --   05/01/23 1020 98.2 °F (36.8 °C) (!) 104 16 (!) 141/86 99 %         Physical Exam:  Patient without distress. Heart: Regular rate and rhythm or S1S2 present  Lung: clear to auscultation throughout lung fields, no wheezes, no rales, no rhonchi and normal respiratory effort  Abdomen: soft, nontender  Fundus: soft and non tender  Cervical Exam: in office  Lower Extremities: no c t 1+ le edema    Membranes:  Intact    Prenatal Labs: No results found for: RUBELLAEXT, GRBSEXT, HBSAGEXT, HIVEXT, RPREXT, GONNOEXT, CHLAMEXT, RUBELLAEXT, GRBSEXT, HBSAGEXT, HIVEXT, RPREXT, GONNOEXT, CHLAMEXT       Recent Results (from the past 12 hour(s))   CBC WITH AUTOMATED DIFF    Collection Time: 05/01/23 10:25 AM   Result Value Ref Range    WBC 11.2 (H) 3.6 - 11.0 K/uL    RBC 3.91 3.80 - 5.20 M/uL    HGB 11.5 11.5 - 16.0 g/dL    HCT 35.2 35.0 - 47.0 %    MCV 90.0 80.0 - 99.0 FL    MCH 29.4 26.0 - 34.0 PG    MCHC 32.7 30.0 - 36.5 g/dL    RDW 14.6 (H) 11.5 - 14.5 %    PLATELET 643 314 - 381 K/uL    MPV 11.9 8.9 - 12.9 FL    NRBC 0.0 0  WBC    ABSOLUTE NRBC 0.00 0.00 - 0.01 K/uL    NEUTROPHILS 77 (H) 32 - 75 %    LYMPHOCYTES 12 12 - 49 %    MONOCYTES 9 5 - 13 %    EOSINOPHILS 1 0 - 7 %    BASOPHILS 0 0 - 1 %    IMMATURE GRANULOCYTES 1 (H) 0.0 - 0.5 %    ABS. NEUTROPHILS 8.5 (H) 1.8 - 8.0 K/UL    ABS. LYMPHOCYTES 1.4 0.8 - 3.5 K/UL    ABS. MONOCYTES 1.0 0.0 - 1.0 K/UL    ABS. EOSINOPHILS 0.1 0.0 - 0.4 K/UL    ABS. BASOPHILS 0.0 0.0 - 0.1 K/UL    ABS. IMM.  GRANS. 0.2 (H) 0.00 - 0.04 K/UL    DF AUTOMATED     METABOLIC PANEL, COMPREHENSIVE    Collection Time: 05/01/23 10:25 AM   Result Value Ref Range    Sodium 139 136 - 145 mmol/L    Potassium 4.0 3.5 - 5.1 mmol/L    Chloride 113 (H) 97 - 108 mmol/L    CO2 23 21 - 32 mmol/L    Anion gap 3 (L) 5 - 15 mmol/L    Glucose 77 65 - 100 mg/dL    BUN 7 6 - 20 MG/DL    Creatinine 0.83 0.55 - 1.02 MG/DL    BUN/Creatinine ratio 8 (L) 12 - 20      eGFR >60 >60 ml/min/1.73m2    Calcium 8.9 8.5 - 10.1 MG/DL    Bilirubin, total 0.4 0.2 - 1.0 MG/DL    ALT (SGPT) 33 12 - 78 U/L    AST (SGOT) 26 15 - 37 U/L    Alk. phosphatase 172 (H) 45 - 117 U/L    Protein, total 6.3 (L) 6.4 - 8.2 g/dL    Albumin 2.6 (L) 3.5 - 5.0 g/dL    Globulin 3.7 2.0 - 4.0 g/dL    A-G Ratio 0.7 (L) 1.1 - 2.2     PROTEIN/CREATININE RATIO, URINE    Collection Time: 23 10:53 AM   Result Value Ref Range    Protein, urine random 5 0.0 - 11.9 mg/dL    Creatinine, urine random 17.00 mg/dL    Protein/Creat. urine Ratio 0.3             Assessment/Plan:   29 y.o.  39w4d  Labile BP, proteinuria  The patient does not have anemia  GBS positive    Reassuring fetal surveillance    Past Medical History:   Diagnosis Date    Depression     Pap smear for cervical cancer screening 10/06/2022    normal; hpv tested         Plan:   PIH precautions. CEFM/TOCO  Detailed discussion re indications and options for IOL. Pt strongly desires KAREN spontaneous labor, and defers IOL unless clearly needed. Since BP normalized and labs stable x proteinuria, will disch home with strict PIH precautions. Pt will keep BP log at home BID  Growth US next week  Notify MD if home SBP>140 or DBP>90  Answered questions re LE edema and diuretics, do not advise at this point. PIH precautions reviewed with patient . Disc off label of misoprostol for IOL  Disc r/b/a of induction and pitocin use and increase risk of longer labor,  section, bleeding and infection.    Pt prefers to defer IOL today for spontaneous labor and fu BP as outpatient    Watch for some warning signs of preeclampsia and contact the office with any concerns:  Swelling of face or hands  A headache that will not go away  Seeing spots or changes in eyesight  Pain in the upper abdomen or shoulder  Nausea and vomiting (in the second half of pregnancy)  Sudden weight gain  Difficulty breathing     Information about Elevated Blood Pressures  in Pregnancy:  [email or web address]      Signed By:  Wili Nunez MD     May 1, 2023         NST Inpatient Procedure Note    Brinda Marrero presents for fetal non-stress test.    Indication is labile BP. She is 39w4d. She has been monitored for more than 60 minutes. The FHR was reactive. NST Interpretation:   FHR baseline 130-140 bpm,   Variability:  moderate  Accelerations:  present  Decelerations Absent. Uterine contractions:   irregular    Assessment  NST is reactive. NST is reassuring. Patient does need admission/observation for further monitoring. Leesa Pfeiffer was informed of the NST results and her questions were answered. Plan:    [] Continue admission to labor and delivery    [] Continue observation   [] Keep routine OB follow up upon discharge   [] Reviewed fetal movement kick counts, notify MD if decreased   []    []          On this date, 5/1/2023,  I have spent 20 minutes reviewing previous notes, examining the patient, reviewing test results and face to face time with the patient discussing the diagnosis, plan of care and importance of compliance with the treatment plan and performing an exam.  We reviewed the planned hospital course as well as documented on the day of the hospitalization.    Pt defers IOL

## 2023-05-01 NOTE — PROGRESS NOTES
1020: Patient presented to L&D from office with c/o high BPs. Patient states they were 130s/90s. Denies any symptoms. No headache, floaters, nausea/GI symptoms. FHR monitor placed. IV started-labs drawn.   + NST confirmed by KONSTANTIN Rogers. 1200: Dr. Jones Oar at bedside discussing POC. Received orders to discharge patient. 1220: Reviewed discharge instructions with patient. Allowed for questions. IV removed. Patient walked off unit.

## 2023-05-01 NOTE — DISCHARGE INSTRUCTIONS
Pregnancy Precautions: Care Instructions  Your Care Instructions     There is no sure way to prevent labor before your due date ( labor) or to prevent most other pregnancy problems. But there are things you can do to increase your chances of a healthy pregnancy. Go to your appointments, follow your doctor's advice, and take good care of yourself. Eat well, and exercise (if your doctor agrees). And make sure to drink plenty of water. Follow-up care is a key part of your treatment and safety. Be sure to make and go to all appointments, and call your doctor if you are having problems. It's also a good idea to know your test results and keep a list of the medicines you take. How can you care for yourself at home? Make sure you go to your prenatal appointments. At each visit, your doctor will check your blood pressure. Your doctor will also check to see if you have protein in your urine. High blood pressure and protein in urine are signs of preeclampsia. This condition can be dangerous for you and your baby. Drink plenty of fluids. Dehydration can cause contractions. If you have kidney, heart, or liver disease and have to limit fluids, talk with your doctor before you increase the amount of fluids you drink. Tell your doctor right away if you notice any symptoms of an infection, such as:  Burning when you urinate. A foul-smelling discharge from your vagina. Vaginal itching. Unexplained fever. Unusual pain or soreness in your uterus or lower belly. Eat a balanced diet. Include plenty of foods that are high in calcium and iron. Foods high in calcium include milk, cheese, yogurt, almonds, and broccoli. Foods high in iron include red meat, shellfish, poultry, eggs, beans, raisins, whole-grain bread, and leafy green vegetables. Do not smoke. If you need help quitting, talk to your doctor about stop-smoking programs and medicines. These can increase your chances of quitting for good.   Do not drink alcohol or use marijuana or illegal drugs. Follow your doctor's directions about activity. Your doctor will let you know how much, if any, exercise you can do. Ask your doctor if you can have sex. If you are at risk for early labor, your doctor may ask you to not have sex. Take care to prevent falls. During pregnancy, your joints are loose, and your balance is off. Sports such as bicycling, skiing, or in-line skating can increase your risk of falling. And don't ride horses or motorcycles, dive, water ski, scuba dive, or parachute jump while you are pregnant. Avoid things that can make your body too hot and may be harmful to your baby, such as a hot tub or sauna. Or talk with your doctor before doing anything that raises your body temperature. Your doctor can tell you if it's safe. Do not take any over-the-counter or herbal medicines or supplements without talking to your doctor or pharmacist first.  When should you call for help? Call 911  anytime you think you may need emergency care. For example, call if:    You passed out (lost consciousness). You have a seizure. You have severe vaginal bleeding. You have severe pain in your belly or pelvis. You have had fluid gushing or leaking from your vagina and you know or think the umbilical cord is bulging into your vagina. If this happens, immediately get down on your knees so your rear end (buttocks) is higher than your head. This will decrease the pressure on the cord until help arrives. Call your doctor now or seek immediate medical care if:    You have signs of preeclampsia, such as:  Sudden swelling of your face, hands, or feet. New vision problems (such as dimness, blurring, or seeing spots). A severe headache. You have any vaginal bleeding. You have belly pain or cramping. You have a fever. You have had regular contractions (with or without pain) for an hour.  This means that you have 8 or more within 1 hour or 4 or more in 20 minutes after you change your position and drink fluids. You have a sudden release of fluid from your vagina. You have low back pain or pelvic pressure that does not go away. You notice that your baby has stopped moving or is moving much less than normal.   Watch closely for changes in your health, and be sure to contact your doctor if you have any problems. Where can you learn more? Go to http://www.petit.com/  Enter Y951 in the search box to learn more about \"Pregnancy Precautions: Care Instructions. \"  Current as of: November 9, 2022               Content Version: 13.6  © 1011-3707 [employer]. Care instructions adapted under license by [employer] (which disclaims liability or warranty for this information). If you have questions about a medical condition or this instruction, always ask your healthcare professional. [address] any warranty or liability for your use of this information. Preeclampsia: Care Instructions  Preeclampsia is a condition that can happen when your blood pressure rises during pregnancy. If it's severe and not treated, it can cause seizures and damage to your liver or kidneys. It can also prevent your baby from getting enough nutrients and oxygen. This can cause low birth weight and other problems. Most people with preeclampsia have healthy babies. Preeclampsia usually goes away in the weeks after birth. In rare cases, symptoms of preeclampsia don't show up until days or weeks after childbirth. Follow your doctor's directions about how active you can be. Your doctor will watch you closely to prevent problems. And if your health or your baby's health is at risk, they'll deliver your baby early. Keep track of your blood pressure at home if your doctor asks you to. Ask your doctor to make sure that the monitor is working and that you're using it right.  Follow instructions about when to take your blood pressure and what to avoid before taking your blood pressure. Take medicines exactly as prescribed. You may need to take medicine to control your blood pressure. Don't smoke. If you need help quitting, talk to your doctor. Check your baby's movements. Once each day, time how long it takes to count 10 movements. If you don't feel at least 10 movements in 2 hours, call your doctor. When should you call for help? Share this information with your partner or a friend. They can help you watch for warning signs. Call 911  anytime you think you may need emergency care. For example, call if:    You passed out (lost consciousness). You have a seizure. You have trouble breathing. You have chest pain. Call your doctor now or seek immediate medical care if:    You have:  Sudden swelling of your face, hands, or feet. New vision problems (such as dimness, blurring, or seeing spots). A severe headache. Your blood pressure is very high, such as 160/110 or higher. Or your blood pressure is higher than your doctor told you it should be, or it rises quickly. You have new nausea or vomiting. You think that you are in labor. You have pain in your belly or pelvis. You gain weight rapidly. Follow-up care is a key part of your treatment and safety. Be sure to make and go to all appointments, and call your doctor if you are having problems. It's also a good idea to know your test results and keep a list of the medicines you take. Where can you learn more? Go to http://www.gray.com/  Enter Z954 in the search box to learn more about \"Preeclampsia: Care Instructions. \"  Current as of: November 9, 2022               Content Version: 13.6  © 5760-8296 [employer]. Care instructions adapted under license by [employer] (which disclaims liability or warranty for this information).  If you have questions about a medical condition or this instruction, always ask your healthcare professional. [place] [address] any warranty or liability for your use of this information.

## 2023-05-11 ENCOUNTER — ROUTINE PRENATAL (OUTPATIENT)
Age: 28
End: 2023-05-11

## 2023-05-11 VITALS
SYSTOLIC BLOOD PRESSURE: 128 MMHG | BODY MASS INDEX: 40.8 KG/M2 | WEIGHT: 239 LBS | HEART RATE: 102 BPM | HEIGHT: 64 IN | DIASTOLIC BLOOD PRESSURE: 88 MMHG

## 2023-05-11 DIAGNOSIS — Z34.00 ENCOUNTER FOR SUPERVISION OF NORMAL FIRST PREGNANCY, UNSPECIFIED TRIMESTER: Primary | ICD-10-CM

## 2023-05-11 DIAGNOSIS — O48.0 POST-TERM PREGNANCY, 40-42 WEEKS OF GESTATION: ICD-10-CM

## 2023-05-11 PROCEDURE — 0502F SUBSEQUENT PRENATAL CARE: CPT | Performed by: OBSTETRICS & GYNECOLOGY

## 2023-05-11 RX ORDER — BUPROPION HYDROCHLORIDE 300 MG/1
300 TABLET ORAL DAILY
Qty: 30 TABLET | Refills: 2 | Status: ON HOLD | OUTPATIENT
Start: 2023-05-11 | End: 2023-08-10

## 2023-05-11 NOTE — PROGRESS NOTES
[address] [facility]  [email or web address]  [phone]    Abimbola Smith MD, FACOG      Follow-up OB visit    Patient Active Problem List    Diagnosis Date Noted    Elevated blood pressure affecting pregnancy in third trimester, antepartum 2023    Palpitations 2023    Prenatal care of primigravida, antepartum 10/06/2022       Vitals:    23 0905   BP: 128/88   Pulse: (!) 102   Weight: 239 lb (108.4 kg)   Height: 5' 4\" (1.626 m)     See PN flowsheet for exam    29 y.o.  41w0d   Routine prenatal visit  Encounter Diagnoses   Name Primary? Encounter for supervision of normal first pregnancy, unspecified trimester Yes    Post-term pregnancy, 40-42 weeks of gestation        Pt interested in IOL now  Disc r/b/a of induction and pitocin use and increase risk of longer labor,  section, bleeding and infection.    Disc off label pit  Declines cook  Epds: 10  Left message with IOL they will call back : fri/mon, come in pm before     [] SAB/bleeding precautions reviewed   [] PTL/PPROM precautions reviewed   [] Labor precautions reviewed   [] Fetal kick counts discussed   [] Labs reviewed with patient   [] Anais Stephen precautions reviewed   [] Consent reviewed   [] Handouts given to pt   [] Glucola handout    [] GBS/labor/Magic Hour handout   []    [] We reviewed CDC recommendations for Tdap for patient and close contacts and RBA of receiving in pregnancy, advised obtaining in third trimester   [] Reviewed healthy nutrition in pregnancy and good exercise practices   [] We disc safer medications in pregnancy and referred patient to [facility] AD resources   [] We reviewed CDC recommendations for flu vaccine and RBA of receiving in pregnancy   []    []    []         Abimbola Smith MD

## 2023-05-14 ENCOUNTER — HOSPITAL ENCOUNTER (INPATIENT)
Facility: HOSPITAL | Age: 28
LOS: 4 days | Discharge: HOME | DRG: 772 | End: 2023-05-18
Attending: OBSTETRICS & GYNECOLOGY | Admitting: OBSTETRICS & GYNECOLOGY
Payer: OTHER GOVERNMENT

## 2023-05-14 DIAGNOSIS — Z98.891 H/O: C-SECTION: Primary | ICD-10-CM

## 2023-05-14 PROBLEM — O48.0 POST-DATES PREGNANCY: Status: ACTIVE | Noted: 2023-05-14

## 2023-05-14 LAB
BASOPHILS # BLD: 0 K/UL (ref 0–0.1)
BASOPHILS NFR BLD: 0 % (ref 0–1)
DIFFERENTIAL METHOD BLD: ABNORMAL
EOSINOPHIL # BLD: 0.1 K/UL (ref 0–0.4)
EOSINOPHIL NFR BLD: 1 % (ref 0–7)
ERYTHROCYTE [DISTWIDTH] IN BLOOD BY AUTOMATED COUNT: 14.6 % (ref 11.5–14.5)
HCT VFR BLD AUTO: 36.4 % (ref 35–47)
HGB BLD-MCNC: 12.4 G/DL (ref 11.5–16)
IMM GRANULOCYTES # BLD AUTO: 0.1 K/UL (ref 0–0.04)
IMM GRANULOCYTES NFR BLD AUTO: 1 % (ref 0–0.5)
LYMPHOCYTES # BLD: 2 K/UL (ref 0.8–3.5)
LYMPHOCYTES NFR BLD: 16 % (ref 12–49)
MCH RBC QN AUTO: 29.6 PG (ref 26–34)
MCHC RBC AUTO-ENTMCNC: 34.1 G/DL (ref 30–36.5)
MCV RBC AUTO: 86.9 FL (ref 80–99)
MONOCYTES # BLD: 0.9 K/UL (ref 0–1)
MONOCYTES NFR BLD: 7 % (ref 5–13)
NEUTS SEG # BLD: 9.3 K/UL (ref 1.8–8)
NEUTS SEG NFR BLD: 75 % (ref 32–75)
NRBC # BLD: 0 K/UL (ref 0–0.01)
NRBC BLD-RTO: 0 PER 100 WBC
PLATELET # BLD AUTO: 244 K/UL (ref 150–400)
PMV BLD AUTO: 12.5 FL (ref 8.9–12.9)
RBC # BLD AUTO: 4.19 M/UL (ref 3.8–5.2)
WBC # BLD AUTO: 12.4 K/UL (ref 3.6–11)

## 2023-05-14 PROCEDURE — 1100000000 HC RM PRIVATE

## 2023-05-14 PROCEDURE — 3E0DXGC INTRODUCTION OF OTHER THERAPEUTIC SUBSTANCE INTO MOUTH AND PHARYNX, EXTERNAL APPROACH: ICD-10-PCS | Performed by: OBSTETRICS & GYNECOLOGY

## 2023-05-14 PROCEDURE — 6360000002 HC RX W HCPCS: Performed by: OBSTETRICS & GYNECOLOGY

## 2023-05-14 PROCEDURE — 2580000003 HC RX 258: Performed by: OBSTETRICS & GYNECOLOGY

## 2023-05-14 PROCEDURE — 85025 COMPLETE CBC W/AUTO DIFF WBC: CPT

## 2023-05-14 PROCEDURE — 36415 COLL VENOUS BLD VENIPUNCTURE: CPT

## 2023-05-14 PROCEDURE — 6370000000 HC RX 637 (ALT 250 FOR IP): Performed by: OBSTETRICS & GYNECOLOGY

## 2023-05-14 RX ORDER — SODIUM CHLORIDE, SODIUM LACTATE, POTASSIUM CHLORIDE, AND CALCIUM CHLORIDE .6; .31; .03; .02 G/100ML; G/100ML; G/100ML; G/100ML
500 INJECTION, SOLUTION INTRAVENOUS PRN
Status: DISCONTINUED | OUTPATIENT
Start: 2023-05-14 | End: 2023-05-16

## 2023-05-14 RX ORDER — SODIUM CHLORIDE 0.9 % (FLUSH) 0.9 %
5-40 SYRINGE (ML) INJECTION EVERY 12 HOURS SCHEDULED
Status: DISCONTINUED | OUTPATIENT
Start: 2023-05-14 | End: 2023-05-18 | Stop reason: HOSPADM

## 2023-05-14 RX ORDER — FAMOTIDINE 20 MG/1
20 TABLET, FILM COATED ORAL 2 TIMES DAILY
Status: DISCONTINUED | OUTPATIENT
Start: 2023-05-14 | End: 2023-05-18 | Stop reason: HOSPADM

## 2023-05-14 RX ORDER — SODIUM CHLORIDE 0.9 % (FLUSH) 0.9 %
5-40 SYRINGE (ML) INJECTION PRN
Status: DISCONTINUED | OUTPATIENT
Start: 2023-05-14 | End: 2023-05-18 | Stop reason: HOSPADM

## 2023-05-14 RX ORDER — SODIUM CHLORIDE, SODIUM LACTATE, POTASSIUM CHLORIDE, AND CALCIUM CHLORIDE .6; .31; .03; .02 G/100ML; G/100ML; G/100ML; G/100ML
1000 INJECTION, SOLUTION INTRAVENOUS PRN
Status: DISCONTINUED | OUTPATIENT
Start: 2023-05-14 | End: 2023-05-16

## 2023-05-14 RX ORDER — DOCUSATE SODIUM 100 MG/1
100 CAPSULE, LIQUID FILLED ORAL 2 TIMES DAILY
Status: ON HOLD | COMMUNITY
End: 2023-05-18 | Stop reason: HOSPADM

## 2023-05-14 RX ORDER — ONDANSETRON 2 MG/ML
4 INJECTION INTRAMUSCULAR; INTRAVENOUS EVERY 6 HOURS PRN
Status: DISCONTINUED | OUTPATIENT
Start: 2023-05-14 | End: 2023-05-16

## 2023-05-14 RX ORDER — MISOPROSTOL 200 UG/1
800 TABLET ORAL PRN
Status: DISCONTINUED | OUTPATIENT
Start: 2023-05-14 | End: 2023-05-16

## 2023-05-14 RX ORDER — CARBOPROST TROMETHAMINE 250 UG/ML
250 INJECTION, SOLUTION INTRAMUSCULAR PRN
Status: DISCONTINUED | OUTPATIENT
Start: 2023-05-14 | End: 2023-05-18 | Stop reason: HOSPADM

## 2023-05-14 RX ORDER — METHYLERGONOVINE MALEATE 0.2 MG/ML
200 INJECTION INTRAVENOUS PRN
Status: DISCONTINUED | OUTPATIENT
Start: 2023-05-14 | End: 2023-05-18 | Stop reason: HOSPADM

## 2023-05-14 RX ORDER — SODIUM CHLORIDE 9 MG/ML
25 INJECTION, SOLUTION INTRAVENOUS PRN
Status: DISCONTINUED | OUTPATIENT
Start: 2023-05-14 | End: 2023-05-16

## 2023-05-14 RX ORDER — SODIUM CHLORIDE, SODIUM LACTATE, POTASSIUM CHLORIDE, CALCIUM CHLORIDE 600; 310; 30; 20 MG/100ML; MG/100ML; MG/100ML; MG/100ML
INJECTION, SOLUTION INTRAVENOUS CONTINUOUS
Status: DISCONTINUED | OUTPATIENT
Start: 2023-05-14 | End: 2023-05-16

## 2023-05-14 RX ORDER — DOCUSATE SODIUM 100 MG/1
100 CAPSULE, LIQUID FILLED ORAL 2 TIMES DAILY
Status: DISCONTINUED | OUTPATIENT
Start: 2023-05-14 | End: 2023-05-16

## 2023-05-14 RX ADMIN — Medication 50 MCG: at 19:29

## 2023-05-14 RX ADMIN — SODIUM CHLORIDE 5 MILLION UNITS: 900 INJECTION INTRAVENOUS at 23:46

## 2023-05-15 ENCOUNTER — ANESTHESIA (OUTPATIENT)
Facility: HOSPITAL | Age: 28
DRG: 772 | End: 2023-05-15
Payer: OTHER GOVERNMENT

## 2023-05-15 ENCOUNTER — ANESTHESIA EVENT (OUTPATIENT)
Facility: HOSPITAL | Age: 28
DRG: 772 | End: 2023-05-15
Payer: OTHER GOVERNMENT

## 2023-05-15 PROCEDURE — 0UQC0ZZ REPAIR CERVIX, OPEN APPROACH: ICD-10-PCS | Performed by: OBSTETRICS & GYNECOLOGY

## 2023-05-15 PROCEDURE — 6370000000 HC RX 637 (ALT 250 FOR IP): Performed by: OBSTETRICS & GYNECOLOGY

## 2023-05-15 PROCEDURE — 6360000002 HC RX W HCPCS: Performed by: NURSE ANESTHETIST, CERTIFIED REGISTERED

## 2023-05-15 PROCEDURE — 6360000002 HC RX W HCPCS: Performed by: OBSTETRICS & GYNECOLOGY

## 2023-05-15 PROCEDURE — 2580000003 HC RX 258: Performed by: OBSTETRICS & GYNECOLOGY

## 2023-05-15 PROCEDURE — 7100000000 HC PACU RECOVERY - FIRST 15 MIN: Performed by: OBSTETRICS & GYNECOLOGY

## 2023-05-15 PROCEDURE — 94761 N-INVAS EAR/PLS OXIMETRY MLT: CPT

## 2023-05-15 PROCEDURE — 3609079900 HC CESAREAN SECTION: Performed by: OBSTETRICS & GYNECOLOGY

## 2023-05-15 PROCEDURE — 3700000025 EPIDURAL BLOCK: Performed by: ANESTHESIOLOGY

## 2023-05-15 PROCEDURE — 1100000000 HC RM PRIVATE

## 2023-05-15 PROCEDURE — 2500000003 HC RX 250 WO HCPCS: Performed by: ANESTHESIOLOGY

## 2023-05-15 PROCEDURE — 2709999900 HC NON-CHARGEABLE SUPPLY: Performed by: OBSTETRICS & GYNECOLOGY

## 2023-05-15 PROCEDURE — 6360000002 HC RX W HCPCS: Performed by: ADVANCED PRACTICE MIDWIFE

## 2023-05-15 PROCEDURE — 3700000001 HC ADD 15 MINUTES (ANESTHESIA): Performed by: OBSTETRICS & GYNECOLOGY

## 2023-05-15 PROCEDURE — 1120000000 HC RM PRIVATE OB

## 2023-05-15 PROCEDURE — 7100000001 HC PACU RECOVERY - ADDTL 15 MIN: Performed by: OBSTETRICS & GYNECOLOGY

## 2023-05-15 PROCEDURE — 3700000000 HC ANESTHESIA ATTENDED CARE: Performed by: OBSTETRICS & GYNECOLOGY

## 2023-05-15 PROCEDURE — 59510 CESAREAN DELIVERY: CPT | Performed by: OBSTETRICS & GYNECOLOGY

## 2023-05-15 PROCEDURE — 2500000003 HC RX 250 WO HCPCS: Performed by: NURSE ANESTHETIST, CERTIFIED REGISTERED

## 2023-05-15 PROCEDURE — C1765 ADHESION BARRIER: HCPCS | Performed by: OBSTETRICS & GYNECOLOGY

## 2023-05-15 DEVICE — BARRIER, ABSORBABLE, ADHESION
Type: IMPLANTABLE DEVICE | Status: FUNCTIONAL
Brand: SEPRAFILM®

## 2023-05-15 RX ORDER — KETOROLAC TROMETHAMINE 30 MG/ML
30 INJECTION, SOLUTION INTRAMUSCULAR; INTRAVENOUS EVERY 6 HOURS
Status: DISCONTINUED | OUTPATIENT
Start: 2023-05-16 | End: 2023-05-16

## 2023-05-15 RX ORDER — KETOROLAC TROMETHAMINE 15 MG/ML
INJECTION, SOLUTION INTRAMUSCULAR; INTRAVENOUS PRN
Status: DISCONTINUED | OUTPATIENT
Start: 2023-05-15 | End: 2023-05-15

## 2023-05-15 RX ORDER — PHENYLEPHRINE HCL IN 0.9% NACL 0.4MG/10ML
SYRINGE (ML) INTRAVENOUS PRN
Status: DISCONTINUED | OUTPATIENT
Start: 2023-05-15 | End: 2023-05-15 | Stop reason: SDUPTHER

## 2023-05-15 RX ORDER — ONDANSETRON 2 MG/ML
INJECTION INTRAMUSCULAR; INTRAVENOUS PRN
Status: DISCONTINUED | OUTPATIENT
Start: 2023-05-15 | End: 2023-05-15 | Stop reason: SDUPTHER

## 2023-05-15 RX ORDER — EPHEDRINE SULFATE/0.9% NACL/PF 50 MG/5 ML
SYRINGE (ML) INTRAVENOUS
Status: DISPENSED
Start: 2023-05-15 | End: 2023-05-15

## 2023-05-15 RX ORDER — FENTANYL CITRATE 50 UG/ML
INJECTION, SOLUTION INTRAMUSCULAR; INTRAVENOUS PRN
Status: DISCONTINUED | OUTPATIENT
Start: 2023-05-15 | End: 2023-05-15 | Stop reason: SDUPTHER

## 2023-05-15 RX ORDER — BUPIVACAINE HYDROCHLORIDE 2.5 MG/ML
INJECTION, SOLUTION EPIDURAL; INFILTRATION; INTRACAUDAL PRN
Status: DISCONTINUED | OUTPATIENT
Start: 2023-05-15 | End: 2023-05-15 | Stop reason: SDUPTHER

## 2023-05-15 RX ORDER — LIDOCAINE HYDROCHLORIDE AND EPINEPHRINE 20; 5 MG/ML; UG/ML
INJECTION, SOLUTION EPIDURAL; INFILTRATION; INTRACAUDAL; PERINEURAL PRN
Status: DISCONTINUED | OUTPATIENT
Start: 2023-05-15 | End: 2023-05-15 | Stop reason: SDUPTHER

## 2023-05-15 RX ORDER — NALOXONE HYDROCHLORIDE 0.4 MG/ML
INJECTION, SOLUTION INTRAMUSCULAR; INTRAVENOUS; SUBCUTANEOUS PRN
Status: DISCONTINUED | OUTPATIENT
Start: 2023-05-15 | End: 2023-05-16

## 2023-05-15 RX ORDER — FENTANYL 0.2 MG/100ML-BUPIV 0.125%-NACL 0.9% EPIDURAL INJ 2/0.125 MCG/ML-%
10 SOLUTION INJECTION CONTINUOUS
Status: DISCONTINUED | OUTPATIENT
Start: 2023-05-15 | End: 2023-05-16

## 2023-05-15 RX ORDER — OXYTOCIN 10 [USP'U]/ML
INJECTION, SOLUTION INTRAMUSCULAR; INTRAVENOUS PRN
Status: DISCONTINUED | OUTPATIENT
Start: 2023-05-15 | End: 2023-05-15 | Stop reason: SDUPTHER

## 2023-05-15 RX ORDER — CALCIUM CARBONATE 200(500)MG
500 TABLET,CHEWABLE ORAL 3 TIMES DAILY PRN
Status: DISCONTINUED | OUTPATIENT
Start: 2023-05-15 | End: 2023-05-18 | Stop reason: HOSPADM

## 2023-05-15 RX ORDER — MIDAZOLAM HYDROCHLORIDE 1 MG/ML
INJECTION INTRAMUSCULAR; INTRAVENOUS PRN
Status: DISCONTINUED | OUTPATIENT
Start: 2023-05-15 | End: 2023-05-15 | Stop reason: SDUPTHER

## 2023-05-15 RX ORDER — MORPHINE SULFATE 1 MG/ML
INJECTION, SOLUTION EPIDURAL; INTRATHECAL; INTRAVENOUS PRN
Status: DISCONTINUED | OUTPATIENT
Start: 2023-05-15 | End: 2023-05-15 | Stop reason: SDUPTHER

## 2023-05-15 RX ADMIN — Medication 40 MCG: at 22:26

## 2023-05-15 RX ADMIN — OXYTOCIN 2 MILLI-UNITS/MIN: 10 INJECTION INTRAVENOUS at 11:15

## 2023-05-15 RX ADMIN — Medication 40 MCG: at 22:53

## 2023-05-15 RX ADMIN — SODIUM CHLORIDE, POTASSIUM CHLORIDE, SODIUM LACTATE AND CALCIUM CHLORIDE: 600; 310; 30; 20 INJECTION, SOLUTION INTRAVENOUS at 05:00

## 2023-05-15 RX ADMIN — LIDOCAINE HYDROCHLORIDE,EPINEPHRINE BITARTRATE 5 ML: 20; .005 INJECTION, SOLUTION EPIDURAL; INFILTRATION; INTRACAUDAL; PERINEURAL at 22:11

## 2023-05-15 RX ADMIN — OXYTOCIN 30 UNITS: 10 INJECTION, SOLUTION INTRAMUSCULAR; INTRAVENOUS at 22:27

## 2023-05-15 RX ADMIN — Medication 10 ML/HR: at 06:37

## 2023-05-15 RX ADMIN — LIDOCAINE HYDROCHLORIDE,EPINEPHRINE BITARTRATE 3 ML: 20; .005 INJECTION, SOLUTION EPIDURAL; INFILTRATION; INTRACAUDAL; PERINEURAL at 08:08

## 2023-05-15 RX ADMIN — BUPIVACAINE HYDROCHLORIDE 9 ML: 2.5 INJECTION, SOLUTION EPIDURAL; INFILTRATION; INTRACAUDAL; PERINEURAL at 06:30

## 2023-05-15 RX ADMIN — LIDOCAINE HYDROCHLORIDE 3 ML: 10; .005 INJECTION, SOLUTION EPIDURAL; INFILTRATION; INTRACAUDAL; PERINEURAL at 06:27

## 2023-05-15 RX ADMIN — FAMOTIDINE 20 MG: 20 TABLET ORAL at 10:24

## 2023-05-15 RX ADMIN — CEFAZOLIN SODIUM 2000 MG: 1 POWDER, FOR SOLUTION INTRAMUSCULAR; INTRAVENOUS at 21:56

## 2023-05-15 RX ADMIN — Medication 12 ML/HR: at 14:18

## 2023-05-15 RX ADMIN — SODIUM CHLORIDE, POTASSIUM CHLORIDE, SODIUM LACTATE AND CALCIUM CHLORIDE 1000 ML: 600; 310; 30; 20 INJECTION, SOLUTION INTRAVENOUS at 04:25

## 2023-05-15 RX ADMIN — LIDOCAINE HYDROCHLORIDE,EPINEPHRINE BITARTRATE 5 ML: 20; .005 INJECTION, SOLUTION EPIDURAL; INFILTRATION; INTRACAUDAL; PERINEURAL at 21:57

## 2023-05-15 RX ADMIN — ONDANSETRON HYDROCHLORIDE 4 MG: 2 SOLUTION INTRAMUSCULAR; INTRAVENOUS at 21:53

## 2023-05-15 RX ADMIN — MORPHINE SULFATE 4 MG: 1 INJECTION, SOLUTION EPIDURAL; INTRATHECAL; INTRAVENOUS at 23:22

## 2023-05-15 RX ADMIN — Medication 12 ML/HR: at 19:40

## 2023-05-15 RX ADMIN — LIDOCAINE HYDROCHLORIDE,EPINEPHRINE BITARTRATE 5 ML: 20; .005 INJECTION, SOLUTION EPIDURAL; INFILTRATION; INTRACAUDAL; PERINEURAL at 21:47

## 2023-05-15 RX ADMIN — Medication 80 MCG: at 22:06

## 2023-05-15 RX ADMIN — MIDAZOLAM HYDROCHLORIDE 2 MG: 1 INJECTION, SOLUTION INTRAMUSCULAR; INTRAVENOUS at 22:33

## 2023-05-15 RX ADMIN — SODIUM CHLORIDE 2.5 MILLION UNITS: 9 INJECTION, SOLUTION INTRAVENOUS at 18:27

## 2023-05-15 RX ADMIN — SODIUM CHLORIDE, POTASSIUM CHLORIDE, SODIUM LACTATE AND CALCIUM CHLORIDE: 600; 310; 30; 20 INJECTION, SOLUTION INTRAVENOUS at 23:03

## 2023-05-15 RX ADMIN — LIDOCAINE HYDROCHLORIDE,EPINEPHRINE BITARTRATE 5 ML: 20; .005 INJECTION, SOLUTION EPIDURAL; INFILTRATION; INTRACAUDAL; PERINEURAL at 21:52

## 2023-05-15 RX ADMIN — OXYTOCIN 20 UNITS: 10 INJECTION, SOLUTION INTRAMUSCULAR; INTRAVENOUS at 23:03

## 2023-05-15 RX ADMIN — LIDOCAINE HYDROCHLORIDE,EPINEPHRINE BITARTRATE 2 ML: 20; .005 INJECTION, SOLUTION EPIDURAL; INFILTRATION; INTRACAUDAL; PERINEURAL at 08:11

## 2023-05-15 RX ADMIN — KETOROLAC TROMETHAMINE 30 MG: 15 INJECTION, SOLUTION INTRAMUSCULAR; INTRAVENOUS at 23:23

## 2023-05-15 RX ADMIN — BUTORPHANOL TARTRATE 2 MG: 2 INJECTION, SOLUTION INTRAMUSCULAR; INTRAVENOUS at 05:09

## 2023-05-15 RX ADMIN — BUPIVACAINE HYDROCHLORIDE 10 ML: 2.5 INJECTION, SOLUTION EPIDURAL; INFILTRATION; INTRACAUDAL; PERINEURAL at 07:24

## 2023-05-15 RX ADMIN — SODIUM CHLORIDE 2.5 MILLION UNITS: 9 INJECTION, SOLUTION INTRAVENOUS at 14:02

## 2023-05-15 RX ADMIN — SODIUM CHLORIDE 2.5 MILLION UNITS: 9 INJECTION, SOLUTION INTRAVENOUS at 09:06

## 2023-05-15 RX ADMIN — SODIUM CHLORIDE, POTASSIUM CHLORIDE, SODIUM LACTATE AND CALCIUM CHLORIDE: 600; 310; 30; 20 INJECTION, SOLUTION INTRAVENOUS at 18:29

## 2023-05-15 RX ADMIN — SODIUM CHLORIDE, POTASSIUM CHLORIDE, SODIUM LACTATE AND CALCIUM CHLORIDE: 600; 310; 30; 20 INJECTION, SOLUTION INTRAVENOUS at 22:27

## 2023-05-15 RX ADMIN — SODIUM CHLORIDE 2.5 MILLION UNITS: 9 INJECTION, SOLUTION INTRAVENOUS at 03:45

## 2023-05-15 RX ADMIN — Medication 40 MCG: at 22:57

## 2023-05-15 RX ADMIN — Medication 40 MCG: at 23:13

## 2023-05-15 RX ADMIN — FENTANYL CITRATE 100 MCG: 50 INJECTION, SOLUTION INTRAMUSCULAR; INTRAVENOUS at 08:11

## 2023-05-15 RX ADMIN — Medication 40 MCG: at 22:36

## 2023-05-15 RX ADMIN — BUTORPHANOL TARTRATE 2 MG: 2 INJECTION, SOLUTION INTRAMUSCULAR; INTRAVENOUS at 01:27

## 2023-05-15 RX ADMIN — Medication 40 MCG: at 23:09

## 2023-05-15 RX ADMIN — Medication 40 MCG: at 22:01

## 2023-05-15 RX ADMIN — MORPHINE SULFATE 3 MG: 1 INJECTION, SOLUTION EPIDURAL; INTRATHECAL; INTRAVENOUS at 22:34

## 2023-05-15 RX ADMIN — BUPIVACAINE HYDROCHLORIDE 7 ML: 2.5 INJECTION, SOLUTION EPIDURAL; INFILTRATION; INTRACAUDAL; PERINEURAL at 18:49

## 2023-05-15 RX ADMIN — SODIUM CHLORIDE, POTASSIUM CHLORIDE, SODIUM LACTATE AND CALCIUM CHLORIDE: 600; 310; 30; 20 INJECTION, SOLUTION INTRAVENOUS at 13:58

## 2023-05-15 ASSESSMENT — PAIN DESCRIPTION - ORIENTATION
ORIENTATION: LOWER
ORIENTATION: LOWER

## 2023-05-15 ASSESSMENT — PAIN SCALES - GENERAL
PAINLEVEL_OUTOF10: 10
PAINLEVEL_OUTOF10: 10

## 2023-05-15 ASSESSMENT — PAIN DESCRIPTION - LOCATION
LOCATION: ABDOMEN
LOCATION: ABDOMEN

## 2023-05-15 ASSESSMENT — PAIN DESCRIPTION - DESCRIPTORS
DESCRIPTORS: CRAMPING
DESCRIPTORS: CRAMPING

## 2023-05-15 NOTE — PROGRESS NOTES
Ob Hospitalist    The patient c/o of constant strong pelvic pressure.     Vitals:    05/15/23 1733 05/15/23 1747 05/15/23 1803 05/15/23 1818   BP: 102/70 (!) 115/56 133/78 105/63   Pulse: 94 (!) 101 (!) 105 (!) 112   Resp:       Temp:       TempSrc:       SpO2:          FSE: 130 moderate variability, accelerations present, occasional variable decels, cat 2  IUPC: contractions q 1-2 minutes, inadequate MVUs, pitocin at 10 mu  EFW: 9#  SVE: 6/90/-2 vtx, adequate pelvis    Ass/Plan:  at 41 4/7 wks IOL for post dates, no cervical change In 5 hours, inadequate MVUs, variable decels  -Amnioinfusion  -Titrate pitocin prn adeqaute MVUs  -Limit cervical exams to minimize infection risk

## 2023-05-15 NOTE — PROGRESS NOTES
Pt unable to tolerate sve. Dr. Marisa Mckeon called to stat OR case. IV pain medication requested for pain relief.

## 2023-05-15 NOTE — PROGRESS NOTES
Sp SROM  Sp epidural x2  Pt requests MD to place gonzalez catheter. Vitals:    05/15/23 0847   BP: (!) 92/52   Pulse: (!) 114   Resp:    Temp:    SpO2:        Cx: c/5/0 vtx    29 y.o.  41w4d  Post dates IOL    Anticipate     Pt declined placement of FSE/IUPC at this point    Gonzalez catheter placed in standard fashion after prepping urethral opening with betadine. Clear yellow urine drainage noted. Pt tolerated procedure well. Abad Miranda MD      NST Inpatient Procedure Note    Stacy Washington presents for fetal non-stress test.    Indication is post dates. She is 41w4d. She has been monitored for more than 30 minutes. The FHR was reactive. NST Interpretation:   FHR baseline 130 bpm,   Variability:  moderate  Accelerations:  absent  Decelerations variable decel 70s < 1 min, not repetitve. Uterine contractions:  q 3-4 minutes    Assessment  NST is  reactive. NST is  reassuring. Patient does need admission/observation for further monitoring. Chano Novak was informed of the NST results and her questions were answered. Plan:    [] Continue admission on Labor and Delivery    [] Continue observation on Labor and Delivery   [] Keep routine OB follow up upon discharge   [] Reviewed fetal movement kick counts, notify MD if decreased   [] Continue continuous fetal monitoring.    []

## 2023-05-15 NOTE — PROGRESS NOTES
Last check ~ 1230pm  CTSP to place IUPC due to trouble monitoring UC and titrating pitocin. Disc rba of IUPC and purpose explained. Pt amenable to placement. Placed without difficulty.      Heladio Holly MD

## 2023-05-15 NOTE — ANESTHESIA PROCEDURE NOTES
Epidural Block    Patient location during procedure: OB  Start time: 5/15/2023 8:02 AM  End time: 5/15/2023 8:09 AM  Reason for block: labor epidural and at surgeon's request  Staffing  Resident/CRNA: LORA Nicholson CRNA  Epidural  Patient position: sitting  Prep: DuraPrep  Patient monitoring: continuous pulse ox and frequent blood pressure checks  Approach: midline  Location: L3-4  Injection technique: LALITA air and LALITA saline  Provider prep: mask and sterile gloves  Needle  Needle type: Tuohy   Needle gauge: 17 G  Needle length: 3.5 in  Needle insertion depth: 6 cm  Catheter type: multi-orifice  Catheter size: 20 G  Catheter at skin depth: 10 cm  Test dose: negativeCatheter Secured: tegaderm  Assessment  Sensory level: T10  Hemodynamics: stable  Attempts: 1  Outcomes: uncomplicated and patient tolerated procedure well  Preanesthetic Checklist  Completed: patient identified, IV checked, site marked, risks and benefits discussed, surgical/procedural consents, equipment checked, pre-op evaluation, timeout performed, anesthesia consent given, oxygen available, monitors applied/VS acknowledged and blood product R/B/A discussed and consented

## 2023-05-15 NOTE — PROGRESS NOTES
ITSP for decel that has resolved. Pitocin off. CEFM : 140s, mod tim now min tim  no accels tim decel to 100s < 20 sec. Cx/6/0 vtx. FSE placed after rba discussed and pt amenable.      29 y.o.  41w4d     Fse/iupc    Monitor for progress.      If RFS will consider resuming AOL w pitocin      MVU << 180    Bhumi Roberts MD

## 2023-05-15 NOTE — PROGRESS NOTES
Pt comfortable with epidural    Cervix unchanged    Will begin pitocin (not started). 29 y.o.  41w4d     Anticipate  disc internal monitoring if no cx change next check.

## 2023-05-15 NOTE — ANESTHESIA PROCEDURE NOTES
Epidural Block    Patient location during procedure: OB  Start time: 5/15/2023 6:20 AM  End time: 5/15/2023 6:31 AM  Reason for block: labor epidural  Staffing  Performed: anesthesiologist   Anesthesiologist: Keny Albert MD  Epidural  Patient position: sitting  Prep: ChloraPrep  Patient monitoring: continuous pulse ox and frequent blood pressure checks  Approach: midline  Location: L3-4  Injection technique: LALITA saline  Provider prep: mask and sterile gloves  Needle  Needle type: Tuohy   Needle gauge: 17 G  Needle insertion depth: 8 cm  Catheter type: multi-orifice  Catheter size: 20 G  Test dose: negativeCatheter Secured: tegaderm and tape  Assessment  Hemodynamics: stable  Attempts: 1  Outcomes: uncomplicated  Preanesthetic Checklist  Completed: patient identified, IV checked, site marked, risks and benefits discussed, surgical/procedural consents, equipment checked, pre-op evaluation, timeout performed, anesthesia consent given, oxygen available, monitors applied/VS acknowledged, fire risk safety assessment completed and verbalized and blood product R/B/A discussed and consented

## 2023-05-15 NOTE — PROGRESS NOTES
0700 - Bedside shift change report given to [facility], RN (oncoming nurse) by Patrick Howard, SRIKANTH (offgoing nurse). Report included the following information Nurse Handoff Report. 0730 - Patient requesting to see Dr. Manolo Luo and for Dr. Manolo Luo to put her gonzalez catheter in. This RN shahrzad served Dr. Manolo Luo. 5496 - Patient feeling no relief from extra bolus for epidural.    0758 - Juan Antonio Delgado CRNA in room to replace epidural.    0802 - Time out.    7831 - Test dose. VORB by Juan Antonio Delgado CRNA to increase epidural dosage to 12 mL/hr instead of 10 mL/hr.    0830 - Dr. Manolo Lou in room for SVE. Patient 5/90/-1. Gonzalez catheter placed by Dr. Manolo Luo. 0845 - Patent feeling no pain after epidural placement. 0 - Dr. Manolo Luo at bedside for SVE. Patient unchanged at 5/90/-1. Pitocin started. 1200 - This RN having trouble picking up patient's contractions. This RN perfect served Dr. Manolo Luo and asked if she would place an IUPC. Dr. Manolo Luo to come to bedside shortly. 1225 - IUPC placed by Dr. Manolo Luo. [facility] having prolonged deceleration. Pitocin stopped and patient placed in trendelenburg. Fluid bolus given. Dr. Manolo Luo notified. SVE by Denis Escobedo RN. Patient 7 cm.    1351 - Dr. Manolo Luo at bedside. SVE; patient 6/90/-1. FSE placed. 1518 - Patient complaining of rectal pressure. SVE by this RN. Patient 7 cm. 1530 - Patient pressed epidural bolus button. 46 - Dr. Manolo Luo at bedside for SVE. Patient 6/90/0. This RN called Juan Antonio Delgado CRNA to re dose patient. Patient not feeling relief from epidural button press. [address], CRNA in room to re-dose patient. Patient feeling some relief from re dose. 1649 - Patient had a deceleration. Patient turned onto exaggerated side lying position (left side). 1820 - Patient feeling constant pressure to push. This RN called Dr. Glendy Baldwin to come to bedside for SVE. 1830 - This RN called Dr. Abelino Kirkpatrick to re dose patient.  Patient having lots of

## 2023-05-15 NOTE — PROGRESS NOTES
Labor Progress Note    Patient seen, fetal heart rate and contraction pattern evaluated. Pt co more pressure like she needs to push    Physical Exam:  Vitals:    05/15/23 1532   BP: 134/67   Pulse: (!) 109   Resp:    Temp:    SpO2:      MVUs 115    Cervical Exam: c/6/0 vtx, OT    Membranes:   sp SROM        Assessment/Plan:  29 y.o.  41w4d   Reassuring fetal status. FSE/IUPC  PT with more discomfort will redose epidural try position changes to rotate the head and titrate pitocin while RFS. Disc goal of adequate MVUS and then assess cervical change  Disc indications for CS if indicated: APA, NRFS  Pt desires continue BRITT       Merlin Emery MD      NST Inpatient Procedure Note    Bam Singh presents for fetal non-stress test.    Indication is labor. She is 41w4d. She has been monitored for more than 60 minutes. The FHR was reactive. NST Interpretation:   FHR baseline 120 bpm,   Variability moderate  Accelerations present. Decelerations Absent. Uterine contractions were q 3 minutes     Assessment  NST is reactive. NST is reassuring. Patient does need admission/observation for further monitoring.       Plan:    [x] Continue admission to labor and delivery    [] Continue observation   [] Keep routine OB follow up upon discharge   [] Reviewed fetal movement kick counts, notify MD if decreased   []    []

## 2023-05-15 NOTE — CARE COORDINATION
5/15/2023  2:12 PM    CM met with BLANCA to complete initial assessment and begin discharge planning. MOB verified and confirmed demographics. BLANCA lives with Korey Fitzgerald, at the address on file. BLANCA reports she has good family support, and feels like she has the support she needs when she returns home. BLANCA plans to breast feed baby and has pump to use at home. Dr. Alessandro Flores, will provide follow up care for infant. BLANCA has car seat, bassinet/crib, clothing, bottles and all necessary supplies for baby. BLANCA has [employer],  and will be adding baby to this policy. CM discussed process to add baby to insurance, BLANCA verbalized understanding. Delgado [place]      05/14/23 6885   Service Assessment   Patient Orientation Alert and Oriented   Cognition Alert   History Provided By Patient   Primary Caregiver Self   Support Systems Spouse/Significant Other   PCP Verified by CM Yes   Last Visit to PCP Within last 3 months   Prior Functional Level Independent in ADLs/IADLs   Current Functional Level Independent in ADLs/IADLs   Can patient return to prior living arrangement Yes   Ability to make needs known: Good   Family able to assist with home care needs: Yes   Would you like for me to discuss the discharge plan with any other family members/significant others, and if so, who?  No   Discharge Planning   Type of Residence [address] [place] [place] [address]

## 2023-05-15 NOTE — ANESTHESIA PRE PROCEDURE
Department of Anesthesiology  Preprocedure Note       Name:  Kalie Silva   Age:  29 y.o.  :  1995                                          MRN:  [id number]         Date:  5/15/2023      Surgeon: * No surgeons listed *    Procedure: * No procedures listed *    Medications prior to admission:   Prior to Admission medications    Medication Sig Start Date End Date Taking? Authorizing Provider   docusate sodium (COLACE) 100 MG capsule Take 1 capsule by mouth 2 times daily   Yes Historical Provider, MD   vitamin D 25 MCG (1000 UT) CAPS Vitamin D3 25 mcg (1,000 unit) capsule   Take 1 capsule every day by oral route for 30 days.     Historical Provider, MD   buPROPion (WELLBUTRIN XL) 300 MG extended release tablet Take 1 tablet by mouth daily 5/11/23 8/10/23  Petrona Hinds MD   Prenatal Vit w/Yh-Brcwiwzjc-IQ (PNV PO) Take by mouth    Historical Provider, MD   aspirin 81 MG chewable tablet Take 1 tablet by mouth daily    Ar Automatic Reconciliation   busPIRone (BUSPAR) 7.5 MG tablet Take 1 tablet by mouth as needed  Patient not taking: Reported on 2023    Ar Automatic Reconciliation   famotidine (PEPCID) 20 MG tablet Take 1 tablet by mouth 2 times daily    Ar Automatic Reconciliation       Current medications:    Current Facility-Administered Medications   Medication Dose Route Frequency Provider Last Rate Last Admin    butorphanol (STADOL) injection 2 mg  2 mg IntraVENous Q3H PRN Vassie Daily, APRN - CNM   2 mg at 05/15/23 0509    fentaNYL 2 mcg/mL bupivacaine 0.125% in sodium chloride 0.9% 100 mL epidural infusion  10 mL/hr Epidural Continuous Liam Cash MD 10 mL/hr at 05/15/23 0637 10 mL/hr at 05/15/23 1354    naloxone 0.4 mg in 10 mL sodium chloride syringe   IntraVENous PRN Liam Cash MD        lactated ringers IV soln infusion   IntraVENous Continuous Petrona Hinds  mL/hr at 05/15/23 0641 Rate Change at 05/15/23 0641    lactated ringers bolus  500 mL IntraVENous PRN Vikki Ceballos

## 2023-05-16 LAB
ABO + RH BLD: NORMAL
BASOPHILS # BLD: 0 K/UL (ref 0–0.1)
BASOPHILS NFR BLD: 0 % (ref 0–1)
BLASTS NFR BLD MANUAL: 0 %
BLOOD GROUP ANTIBODIES SERPL: NORMAL
DIFFERENTIAL METHOD BLD: ABNORMAL
EOSINOPHIL # BLD: 0 K/UL (ref 0–0.4)
EOSINOPHIL NFR BLD: 0 % (ref 0–7)
ERYTHROCYTE [DISTWIDTH] IN BLOOD BY AUTOMATED COUNT: 15 % (ref 11.5–14.5)
HCT VFR BLD AUTO: 34.6 % (ref 35–47)
HGB BLD-MCNC: 11.5 G/DL (ref 11.5–16)
IMM GRANULOCYTES # BLD AUTO: 0 K/UL
IMM GRANULOCYTES NFR BLD AUTO: 0 %
LYMPHOCYTES # BLD: 2.3 K/UL (ref 0.8–3.5)
LYMPHOCYTES NFR BLD: 11 % (ref 12–49)
MCH RBC QN AUTO: 29.6 PG (ref 26–34)
MCHC RBC AUTO-ENTMCNC: 33.2 G/DL (ref 30–36.5)
MCV RBC AUTO: 89.2 FL (ref 80–99)
METAMYELOCYTES NFR BLD MANUAL: 0 %
MONOCYTES # BLD: 1.1 K/UL (ref 0–1)
MONOCYTES NFR BLD: 5 % (ref 5–13)
MYELOCYTES NFR BLD MANUAL: 0 %
NEUTS BAND NFR BLD MANUAL: 2 % (ref 0–6)
NEUTS SEG # BLD: 17.6 K/UL (ref 1.8–8)
NEUTS SEG NFR BLD: 82 % (ref 32–75)
NRBC # BLD: 0 K/UL (ref 0–0.01)
NRBC BLD-RTO: 0 PER 100 WBC
OTHER CELLS NFR BLD MANUAL: 0
PLATELET # BLD AUTO: 224 K/UL (ref 150–400)
PMV BLD AUTO: 12.4 FL (ref 8.9–12.9)
PROMYELOCYTES NFR BLD MANUAL: 0 %
RBC # BLD AUTO: 3.88 M/UL (ref 3.8–5.2)
RBC MORPH BLD: ABNORMAL
SPECIMEN EXP DATE BLD: NORMAL
WBC # BLD AUTO: 21 K/UL (ref 3.6–11)

## 2023-05-16 PROCEDURE — 85007 BL SMEAR W/DIFF WBC COUNT: CPT

## 2023-05-16 PROCEDURE — 86900 BLOOD TYPING SEROLOGIC ABO: CPT

## 2023-05-16 PROCEDURE — 6370000000 HC RX 637 (ALT 250 FOR IP): Performed by: ANESTHESIOLOGY

## 2023-05-16 PROCEDURE — 6370000000 HC RX 637 (ALT 250 FOR IP): Performed by: OBSTETRICS & GYNECOLOGY

## 2023-05-16 PROCEDURE — 85027 COMPLETE CBC AUTOMATED: CPT

## 2023-05-16 PROCEDURE — 6360000002 HC RX W HCPCS: Performed by: OBSTETRICS & GYNECOLOGY

## 2023-05-16 PROCEDURE — 2580000003 HC RX 258: Performed by: OBSTETRICS & GYNECOLOGY

## 2023-05-16 PROCEDURE — 36415 COLL VENOUS BLD VENIPUNCTURE: CPT

## 2023-05-16 PROCEDURE — 1100000000 HC RM PRIVATE

## 2023-05-16 PROCEDURE — 86850 RBC ANTIBODY SCREEN: CPT

## 2023-05-16 PROCEDURE — 86901 BLOOD TYPING SEROLOGIC RH(D): CPT

## 2023-05-16 RX ORDER — IBUPROFEN 800 MG/1
800 TABLET ORAL EVERY 8 HOURS SCHEDULED
Status: DISCONTINUED | OUTPATIENT
Start: 2023-05-16 | End: 2023-05-18 | Stop reason: HOSPADM

## 2023-05-16 RX ORDER — IBUPROFEN 800 MG/1
800 TABLET ORAL EVERY 8 HOURS PRN
Status: ACTIVE | OUTPATIENT
Start: 2023-05-16 | End: 2023-05-18

## 2023-05-16 RX ORDER — OXYCODONE HYDROCHLORIDE 5 MG/1
10 TABLET ORAL EVERY 4 HOURS PRN
Status: DISCONTINUED | OUTPATIENT
Start: 2023-05-16 | End: 2023-05-18 | Stop reason: HOSPADM

## 2023-05-16 RX ORDER — FENTANYL CITRATE 50 UG/ML
100 INJECTION, SOLUTION INTRAMUSCULAR; INTRAVENOUS ONCE
Status: COMPLETED | OUTPATIENT
Start: 2023-05-16 | End: 2023-05-16

## 2023-05-16 RX ORDER — ONDANSETRON 2 MG/ML
4 INJECTION INTRAMUSCULAR; INTRAVENOUS EVERY 6 HOURS PRN
Status: ACTIVE | OUTPATIENT
Start: 2023-05-16 | End: 2023-05-17

## 2023-05-16 RX ORDER — SODIUM CHLORIDE, SODIUM LACTATE, POTASSIUM CHLORIDE, CALCIUM CHLORIDE 600; 310; 30; 20 MG/100ML; MG/100ML; MG/100ML; MG/100ML
INJECTION, SOLUTION INTRAVENOUS CONTINUOUS
Status: DISCONTINUED | OUTPATIENT
Start: 2023-05-16 | End: 2023-05-18 | Stop reason: HOSPADM

## 2023-05-16 RX ORDER — FENTANYL CITRATE 50 UG/ML
INJECTION, SOLUTION INTRAMUSCULAR; INTRAVENOUS
Status: DISPENSED
Start: 2023-05-16 | End: 2023-05-16

## 2023-05-16 RX ORDER — OXYCODONE HYDROCHLORIDE 5 MG/1
5 TABLET ORAL EVERY 4 HOURS PRN
Status: DISPENSED | OUTPATIENT
Start: 2023-05-16 | End: 2023-05-17

## 2023-05-16 RX ORDER — NALOXONE HYDROCHLORIDE 0.4 MG/ML
INJECTION, SOLUTION INTRAMUSCULAR; INTRAVENOUS; SUBCUTANEOUS PRN
Status: ACTIVE | OUTPATIENT
Start: 2023-05-16 | End: 2023-05-17

## 2023-05-16 RX ORDER — OXYCODONE HYDROCHLORIDE 5 MG/1
10 TABLET ORAL EVERY 4 HOURS PRN
Status: ACTIVE | OUTPATIENT
Start: 2023-05-16 | End: 2023-05-17

## 2023-05-16 RX ORDER — MISOPROSTOL 200 UG/1
1000 TABLET ORAL PRN
Status: DISCONTINUED | OUTPATIENT
Start: 2023-05-16 | End: 2023-05-18 | Stop reason: HOSPADM

## 2023-05-16 RX ORDER — OXYCODONE HYDROCHLORIDE 5 MG/1
5 TABLET ORAL EVERY 4 HOURS PRN
Status: DISCONTINUED | OUTPATIENT
Start: 2023-05-16 | End: 2023-05-18 | Stop reason: HOSPADM

## 2023-05-16 RX ORDER — DIAPER,BRIEF,INFANT-TODD,DISP
EACH MISCELLANEOUS 2 TIMES DAILY
Status: DISCONTINUED | OUTPATIENT
Start: 2023-05-16 | End: 2023-05-18 | Stop reason: HOSPADM

## 2023-05-16 RX ORDER — ACETAMINOPHEN 500 MG
1000 TABLET ORAL EVERY 6 HOURS PRN
Status: DISPENSED | OUTPATIENT
Start: 2023-05-16 | End: 2023-05-18

## 2023-05-16 RX ORDER — LANOLIN/MINERAL OIL
LOTION (ML) TOPICAL
Status: DISCONTINUED | OUTPATIENT
Start: 2023-05-16 | End: 2023-05-18 | Stop reason: HOSPADM

## 2023-05-16 RX ORDER — BUPROPION HYDROCHLORIDE 150 MG/1
300 TABLET ORAL DAILY
Status: DISCONTINUED | OUTPATIENT
Start: 2023-05-17 | End: 2023-05-18 | Stop reason: HOSPADM

## 2023-05-16 RX ORDER — DOCUSATE SODIUM 100 MG/1
100 CAPSULE, LIQUID FILLED ORAL 2 TIMES DAILY
Status: DISCONTINUED | OUTPATIENT
Start: 2023-05-16 | End: 2023-05-18 | Stop reason: HOSPADM

## 2023-05-16 RX ORDER — NALBUPHINE HCL 10 MG/ML
5 AMPUL (ML) INJECTION EVERY 4 HOURS PRN
Status: ACTIVE | OUTPATIENT
Start: 2023-05-16 | End: 2023-05-17

## 2023-05-16 RX ORDER — METHYLERGONOVINE MALEATE 0.2 MG/1
200 TABLET ORAL EVERY 6 HOURS
Status: COMPLETED | OUTPATIENT
Start: 2023-05-16 | End: 2023-05-16

## 2023-05-16 RX ADMIN — CEFAZOLIN 2000 MG: 1 INJECTION, POWDER, FOR SOLUTION INTRAMUSCULAR; INTRAVENOUS at 05:10

## 2023-05-16 RX ADMIN — ACETAMINOPHEN 1000 MG: 500 TABLET ORAL at 10:22

## 2023-05-16 RX ADMIN — OXYCODONE 5 MG: 5 TABLET ORAL at 11:45

## 2023-05-16 RX ADMIN — METHYLERGONOVINE MALEATE 200 MCG: 0.2 TABLET ORAL at 16:02

## 2023-05-16 RX ADMIN — OXYCODONE 5 MG: 5 TABLET ORAL at 22:04

## 2023-05-16 RX ADMIN — METHYLERGONOVINE MALEATE 200 MCG: 0.2 TABLET ORAL at 21:38

## 2023-05-16 RX ADMIN — ACETAMINOPHEN 1000 MG: 500 TABLET ORAL at 05:11

## 2023-05-16 RX ADMIN — FAMOTIDINE 20 MG: 20 TABLET ORAL at 09:14

## 2023-05-16 RX ADMIN — CEFAZOLIN 2000 MG: 1 INJECTION, POWDER, FOR SOLUTION INTRAMUSCULAR; INTRAVENOUS at 14:14

## 2023-05-16 RX ADMIN — ACETAMINOPHEN 1000 MG: 500 TABLET ORAL at 16:04

## 2023-05-16 RX ADMIN — IBUPROFEN 800 MG: 800 TABLET, FILM COATED ORAL at 14:14

## 2023-05-16 RX ADMIN — HYDROCORTISONE: 0.01 CREAM TOPICAL at 09:13

## 2023-05-16 RX ADMIN — DOCUSATE SODIUM 100 MG: 100 CAPSULE, LIQUID FILLED ORAL at 21:38

## 2023-05-16 RX ADMIN — IBUPROFEN 800 MG: 800 TABLET, FILM COATED ORAL at 21:38

## 2023-05-16 RX ADMIN — DOCUSATE SODIUM 100 MG: 100 CAPSULE, LIQUID FILLED ORAL at 09:14

## 2023-05-16 RX ADMIN — METHYLERGONOVINE MALEATE 200 MCG: 0.2 TABLET ORAL at 05:11

## 2023-05-16 RX ADMIN — HYDROCORTISONE: 0.01 CREAM TOPICAL at 21:40

## 2023-05-16 RX ADMIN — MISOPROSTOL 1000 MCG: 200 TABLET ORAL at 02:30

## 2023-05-16 RX ADMIN — METHYLERGONOVINE MALEATE 200 MCG: 0.2 TABLET ORAL at 09:14

## 2023-05-16 RX ADMIN — DOCUSATE SODIUM 100 MG: 100 CAPSULE, LIQUID FILLED ORAL at 05:11

## 2023-05-16 RX ADMIN — FENTANYL CITRATE 100 MCG: 50 INJECTION, SOLUTION INTRAMUSCULAR; INTRAVENOUS at 02:47

## 2023-05-16 RX ADMIN — FAMOTIDINE 20 MG: 20 TABLET ORAL at 21:38

## 2023-05-16 RX ADMIN — OXYCODONE 5 MG: 5 TABLET ORAL at 16:02

## 2023-05-16 RX ADMIN — METHYLERGONOVINE MALEATE 200 MCG: 0.2 INJECTION, SOLUTION INTRAMUSCULAR; INTRAVENOUS at 01:54

## 2023-05-16 ASSESSMENT — PAIN DESCRIPTION - LOCATION
LOCATION: VULVA
LOCATION: INCISION;ABDOMEN
LOCATION: ABDOMEN;INCISION
LOCATION: ABDOMEN

## 2023-05-16 ASSESSMENT — PAIN DESCRIPTION - DESCRIPTORS
DESCRIPTORS: SORE

## 2023-05-16 ASSESSMENT — PAIN SCALES - GENERAL
PAINLEVEL_OUTOF10: 5
PAINLEVEL_OUTOF10: 3
PAINLEVEL_OUTOF10: 5
PAINLEVEL_OUTOF10: 4
PAINLEVEL_OUTOF10: 3

## 2023-05-16 ASSESSMENT — PAIN DESCRIPTION - ORIENTATION
ORIENTATION: LOWER
ORIENTATION: ANTERIOR

## 2023-05-16 ASSESSMENT — PAIN - FUNCTIONAL ASSESSMENT
PAIN_FUNCTIONAL_ASSESSMENT: ACTIVITIES ARE NOT PREVENTED
PAIN_FUNCTIONAL_ASSESSMENT: ACTIVITIES ARE NOT PREVENTED

## 2023-05-16 NOTE — L&D DELIVERY NOTE
Laureano , Male Aline Souza [[id number]]      Labor Events     Labor: No   Steroids: None  Cervical Ripening Date/Time:  5/15/23 19:29:00   Cervical Ripening Type: Misoprostol  Antibiotics Received during Labor: Yes  Rupture Identifier: Sac 1  Rupture Date/Time:  23 22:20:00   Rupture Type: SROM  Fluid Color: Clear  Fluid Odor: None  Fluid Volume: Large  Induction: None  Augmentation: Oxytocin  Labor Complications: Failure to Progress in First Stage              Anesthesia    Method: Epidural       Delivery Details      Delivery Date: 5/15/23 Delivery Time: 22:27:00   Delivery Type: , Classical  Trial of Labor?: Yes   Categorization: Primary   Priority: Non-scheduled  Indications for : Arrest of Descent, Active Phase Arrest       Skin Incision Type: Pfannenstiel  Uterine Incision: Low Transverse        Presentation    Presentation: Vertex  Position: Left  _: Occiput  _: Transverse       Shoulder Dystocia    Shoulder Dystocia Present?: No       Assisted Delivery Details    Forceps Attempted?: No  Vacuum Extractor Attempted?: No                           Cord    Vessels: 3 Vessels  Complications: None  Delayed Cord Clamping?: Yes  Cord Clamped Date/Time: 5/15/2023 22:28:00  Cord Blood Disposition: Lab  Gases Sent?: Yes              Placenta    Date/Time: 5/15/2023 22:30:00  Removal: Manual Removal  Appearance: Intact  Disposition: Discarded       Lacerations    Episiotomy: None  Perineal Lacerations: None  Other Lacerations: no non-perineal laceration       Blood Loss  Mother: Jose Mcnair #[id number]     Start of Mother's Information      Delivery Blood Loss  05/15/23 2148 - 05/15/23 2330      Quantitative Blood Loss (mL) Hospital Encounter 505 grams    Total  505 mL               End of Mother's Information  Mother: Jose Mcnair #[id number]                Delivery Providers    Delivering clinician: Basilia Gottlieb MD     Provider Role

## 2023-05-16 NOTE — ANESTHESIA POSTPROCEDURE EVALUATION
Department of Anesthesiology  Postprocedure Note    Patient: Ethan Vazquez  MRN: [id number]  YOB: 1995  Date of evaluation: 5/15/2023      Procedure Summary     Date: 05/15/23 Room / Location: [facility] L&D OR    Anesthesia Start: 620 Anesthesia Stop:     Procedures:        SECTION      Labor Analgesia Diagnosis:       Arrested active phase of labor      (arrested active phase of labor)    Surgeons: Imani Michael MD Responsible Provider: Analy Paris DO    Anesthesia Type: Epidural ASA Status: 2          Anesthesia Type: Epidural    Lalo Phase I:      Lalo Phase II:        Anesthesia Post Evaluation    Patient location during evaluation: PACU  Patient participation: complete - patient participated  Level of consciousness: awake and awake and alert  Pain score: 2  Airway patency: patent  Nausea & Vomiting: no nausea and no vomiting  Complications: no  Cardiovascular status: blood pressure returned to baseline and tachycardic  Respiratory status: acceptable  Hydration status: stable

## 2023-05-16 NOTE — PROGRESS NOTES
0710 SBAR from JESSE Arenas RN. Assumed care of patient. Patient resting in position of comfort in locked and lowered bed. VSS. Patient denies further needs at this time. Call bell in reach. 0940 Pt ambulatory to and from bathroom with steady gait accompanied by this RN. Pads, linens, and gown changed at this time. Ambulation well tolerated. QBL from 7573-8721 = 24mL    1327 Report called to GUSTAVO Gregg.

## 2023-05-16 NOTE — OP NOTE
Skyler Urrutia [facility] 79  OPERATIVE REPORT    Name:  Andrez Angeles  MR#:  [id number]  :  1995  ACCOUNT #:  [de-identified]  DATE OF SERVICE:  05/15/2023    PREOPERATIVE DIAGNOSIS:  51-year-old primigravida at 41-4/7 weeks' gestation with induction of labor for post dates, active phase arrest.    POSTOPERATIVE DIAGNOSIS:  51-year-old primigravida at 41-4/7 weeks' gestation with induction of labor for post dates, active phase arrest and term viable male infant. PROCEDURE PERFORMED:  Primary low segment transverse  section. SURGEON:  Charanjit Menjivar MD    ASSISTANT:  Hetal Mike    ANESTHESIA:  Epidural.    ANESTHESIOLOGIST:   Dr. Raymond Corrales:  None. SPECIMENS REMOVED:  Placenta. IMPLANTS:  Seprafilm. IV FLUIDS:  1800 mL of crystalloid. URINE OUTPUT:  200 mL. QUANTITATIVE BLOOD LOSS:  505 mL. FINDINGS:  A term viable male infant in left occiput transverse presentation with Apgars of 3, 6, 8, and 9, weighing 9 pounds 9 ounces. The amniotic fluid was clear and the placenta was of grossly normal appearance. The patient's uterus and bilateral adnexa were of normal appearance. PROCEDURE:  The patient was taken to the operating room where she was placed in the supine position with a leftward tilt. The patient was prepped and draped in a sterile fashion. A surgical time-out was performed and the patient's identification and procedure were confirmed. An Naresh Mckusick test was performed and the patient was noted to have adequate anesthesia. A Pfannenstiel incision was made with the scalpel and carried down to the underlying layer of fascia using the Bovie. The Bovie was used to score the fascia in the midline and the fascial incision was extended laterally with the Bovie. The superior aspect of the fascial incision was grasped with Kocher clamps and underlying rectus muscles were dissected off using the Garcia scissors and Bovie.   The Kocher clamps were

## 2023-05-16 NOTE — PROGRESS NOTES
Ob Hospitalist    The patient was evaluated because of persistent vaginal bleeding. Fentanyl 100 mcg iv was administered for pain control. Cytotec 1000 mcg buccal was recently administered and she had already received methergine 0.2 mg IM. A bimanual pelvic exam was performed and the uterus was difficult to palpate secondary to the patient's obese habitus and guarding. Eventually I was able to palpate her uterus and it was firm at the level of the umbilicus. A copious amount of clots were evacuated from the patient's vagina and cervix. Several vaginal exams until most of the clots were evacuated. Fundal massage was performed at the uterus remained firm at the umbilicus. Another 30 units of pitocin in 500 ml to run at 125 ml/hr was ordered. QBL post op- 1172 mL, total QBL 1677 mL. Cbc ordered. Methergine 0.2 mg po q 6 hrs x 24 hrs, ancef 2 mg iv q 6 hrs x 24 hrs. Continue to closely monitor on labor and delivery.

## 2023-05-16 NOTE — PLAN OF CARE
Problem: Vaginal Birth or  Section  Goal: Fetal and maternal status remain reassuring during the birth process  Description:  Birth OB-Pregnancy care plan goal which identifies if the fetal and maternal status remain reassuring during the birth process  Outcome: Progressing     Problem: Pain  Goal: Verbalizes/displays adequate comfort level or baseline comfort level  Outcome: Progressing     Problem: Safety - Adult  Goal: Free from fall injury  Outcome: Progressing  Flowsheets (Taken 2023 7885)  Free From Fall Injury: Instruct family/caregiver on patient safety     Problem: Skin/Tissue Integrity  Goal: Absence of new skin breakdown  Description: 1. Monitor for areas of redness and/or skin breakdown  2. Assess vascular access sites hourly  3. Every 4-6 hours minimum:  Change oxygen saturation probe site  4. Every 4-6 hours:  If on nasal continuous positive airway pressure, respiratory therapy assess nares and determine need for appliance change or resting period.   Outcome: Progressing

## 2023-05-16 NOTE — PROGRESS NOTES
190- Report received from SHALINI Mayo RN at this time care taken over,   - This RN at bedside to up pitocin per MD. Pt refused upping pitocin at this time, Would like MD to come to bedside and discuss . - Md at bedside discussing risk vs benefit of continuing induction of labor or moving forward with . Pt decides to move forward with  at this time   - pitocin and amnioinfusion discontinued at this time, belly wiped off with CHG wipes    218- Dr. Iker Calderon notified of pt bleeding, order for cytotec 1000mg buccal.   0230- Cytotec given at this time and fundal performed  239- Dr. Iker Calderon notified of pt QBL and bleeding at this time, request to come to bedside. 65- Md at bedside  18- MD performing bimanual removing clots with constant fundal massage.

## 2023-05-16 NOTE — PROGRESS NOTES
Post-Operative Day Number 1 Progress Note    Patient doing well post-op day 1 from  delivery without significant complaints. Pain controlled on current medication. Voiding without difficulty, normal lochia.     Vitals:  Patient Vitals for the past 8 hrs:   BP Temp Temp src Pulse Resp SpO2   23 0718 112/62 98.5 °F (36.9 °C) Oral (!) 104 16 98 %   23 0612 111/66 -- -- (!) 104 -- 98 %   23 0606 -- -- -- (!) 105 -- 100 %   23 0601 -- -- -- 87 -- 93 %   23 0556 -- -- -- (!) 101 -- 96 %   23 0551 -- -- -- 100 -- 95 %   23 0546 -- -- -- 99 -- 97 %   23 0541 -- -- -- (!) 101 -- 96 %   23 0536 -- -- -- 99 -- 97 %   23 0531 -- -- -- -- -- 96 %   23 0526 -- -- -- -- -- 99 %   23 0521 -- -- -- -- -- (!) 75 %   23 0519 -- -- -- -- -- (!) 66 %   23 0515 -- -- -- -- -- 96 %   23 0509 -- -- -- -- -- 91 %   23 0504 -- -- -- -- -- 94 %   23 0500 -- -- -- -- -- (!) 88 %   23 0454 -- -- -- -- -- 96 %   23 0449 -- -- -- -- -- 96 %   23 0448 (!) 104/54 100.2 °F (37.9 °C) Oral 100 -- --   23 0445 -- -- -- 73 -- (!) 80 %   23 0442 -- -- -- 70 -- 95 %   23 -- -- -- 98 -- 94 %   23 -- -- -- 77 -- 93 %   23 -- -- -- 72 -- 93 %   23 -- -- -- (!) 101 -- 96 %   23 -- -- -- 93 -- 94 %   23 -- -- -- 97 -- 95 %   23 -- -- -- 99 -- 90 %   23 -- -- -- 99 -- 91 %   23 -- -- -- (!) 106 -- 96 %   23 -- -- -- 84 -- 90 %   23 -- -- -- (!) 112 -- 97 %   23 -- -- -- (!) 104 -- 97 %   23 -- -- -- 90 -- 93 %   23 -- -- -- 100 -- 94 %   23 -- -- -- 70 -- (!) 89 %   23 -- -- -- 96 -- 97 %   23 -- -- -- 75 -- 94 %   23 -- -- -- 99 -- 98 %   23 -- -- -- 92 -- 93 %   23 -- -- -- (!) 110 -- 100 %

## 2023-05-16 NOTE — PROGRESS NOTES
The patient is declining to have her pitocin increased and is requesting a . She had brief episode of pain relief with her epidural and is now having constant pelvic pressure and is feeling her contractions. Vitals:    05/15/23 1803 05/15/23 1818 05/15/23 1912 05/15/23 1918   BP: 133/78 105/63 111/64 102/62   Pulse: (!) 105 (!) 112 (!) 107 (!) 113   Resp:       Temp:       TempSrc:       SpO2:    94%      FSE: 145 moderate variability, sporadic variable decels, cat 2  IUPC: contractions q 1-2 minutes. Inadequate MVUs  SVE: deferred    Ass/Plan:  at 41 4/7 wks IOL for post dates with active phase arrest  -proceed with delivery by   -Risks of  section discussed included bleeding with the possible need for blood products; injury to the broad ligament, bladder, ureters, and/or bowel; endometritis; wound infection; and thromboembolism. The patient voiced understanding and is accepting of these risks.

## 2023-05-17 LAB
BASOPHILS # BLD: 0 K/UL (ref 0–0.1)
BASOPHILS NFR BLD: 0 % (ref 0–1)
BLASTS NFR BLD MANUAL: 0 %
DIFFERENTIAL METHOD BLD: ABNORMAL
EOSINOPHIL # BLD: 0.1 K/UL (ref 0–0.4)
EOSINOPHIL NFR BLD: 1 % (ref 0–7)
ERYTHROCYTE [DISTWIDTH] IN BLOOD BY AUTOMATED COUNT: 15.2 % (ref 11.5–14.5)
HCT VFR BLD AUTO: 27.4 % (ref 35–47)
HGB BLD-MCNC: 9.2 G/DL (ref 11.5–16)
IMM GRANULOCYTES # BLD AUTO: 0 K/UL
IMM GRANULOCYTES NFR BLD AUTO: 0 %
LYMPHOCYTES # BLD: 1.9 K/UL (ref 0.8–3.5)
LYMPHOCYTES NFR BLD: 14 % (ref 12–49)
MCH RBC QN AUTO: 30.1 PG (ref 26–34)
MCHC RBC AUTO-ENTMCNC: 33.6 G/DL (ref 30–36.5)
MCV RBC AUTO: 89.5 FL (ref 80–99)
METAMYELOCYTES NFR BLD MANUAL: 0 %
MONOCYTES # BLD: 0.7 K/UL (ref 0–1)
MONOCYTES NFR BLD: 5 % (ref 5–13)
MYELOCYTES NFR BLD MANUAL: 0 %
NEUTS BAND NFR BLD MANUAL: 3 % (ref 0–6)
NEUTS SEG # BLD: 11.1 K/UL (ref 1.8–8)
NEUTS SEG NFR BLD: 77 % (ref 32–75)
NRBC # BLD: 0 K/UL (ref 0–0.01)
NRBC BLD-RTO: 0 PER 100 WBC
OTHER CELLS NFR BLD MANUAL: 0
PLATELET # BLD AUTO: 182 K/UL (ref 150–400)
PMV BLD AUTO: 11.7 FL (ref 8.9–12.9)
PROMYELOCYTES NFR BLD MANUAL: 0 %
RBC # BLD AUTO: 3.06 M/UL (ref 3.8–5.2)
RBC MORPH BLD: ABNORMAL
WBC # BLD AUTO: 13.8 K/UL (ref 3.6–11)

## 2023-05-17 PROCEDURE — 85007 BL SMEAR W/DIFF WBC COUNT: CPT

## 2023-05-17 PROCEDURE — 6370000000 HC RX 637 (ALT 250 FOR IP): Performed by: OBSTETRICS & GYNECOLOGY

## 2023-05-17 PROCEDURE — 85027 COMPLETE CBC AUTOMATED: CPT

## 2023-05-17 PROCEDURE — 1100000000 HC RM PRIVATE

## 2023-05-17 PROCEDURE — 36415 COLL VENOUS BLD VENIPUNCTURE: CPT

## 2023-05-17 RX ADMIN — DOCUSATE SODIUM 100 MG: 100 CAPSULE, LIQUID FILLED ORAL at 07:59

## 2023-05-17 RX ADMIN — IBUPROFEN 800 MG: 800 TABLET ORAL at 06:10

## 2023-05-17 RX ADMIN — OXYCODONE 10 MG: 5 TABLET ORAL at 07:59

## 2023-05-17 RX ADMIN — OXYCODONE 10 MG: 5 TABLET ORAL at 15:23

## 2023-05-17 RX ADMIN — ACETAMINOPHEN 1000 MG: 500 TABLET ORAL at 18:01

## 2023-05-17 RX ADMIN — FAMOTIDINE 20 MG: 20 TABLET ORAL at 11:46

## 2023-05-17 RX ADMIN — ACETAMINOPHEN 1000 MG: 500 TABLET ORAL at 04:15

## 2023-05-17 RX ADMIN — OXYCODONE 5 MG: 5 TABLET ORAL at 19:30

## 2023-05-17 RX ADMIN — OXYCODONE 5 MG: 5 TABLET ORAL at 04:15

## 2023-05-17 RX ADMIN — DOCUSATE SODIUM 100 MG: 100 CAPSULE, LIQUID FILLED ORAL at 21:18

## 2023-05-17 RX ADMIN — OXYCODONE 10 MG: 5 TABLET ORAL at 11:47

## 2023-05-17 RX ADMIN — OXYCODONE 5 MG: 5 TABLET ORAL at 23:25

## 2023-05-17 RX ADMIN — HYDROCORTISONE: 0.01 CREAM TOPICAL at 21:21

## 2023-05-17 RX ADMIN — IBUPROFEN 800 MG: 800 TABLET ORAL at 15:23

## 2023-05-17 RX ADMIN — ACETAMINOPHEN 1000 MG: 500 TABLET ORAL at 11:46

## 2023-05-17 RX ADMIN — IBUPROFEN 800 MG: 800 TABLET, FILM COATED ORAL at 23:25

## 2023-05-17 ASSESSMENT — PAIN DESCRIPTION - DESCRIPTORS
DESCRIPTORS: ACHING
DESCRIPTORS: ACHING;CRAMPING;SORE
DESCRIPTORS: SORE
DESCRIPTORS: ACHING;CRAMPING;SORE
DESCRIPTORS: SORE;ACHING
DESCRIPTORS: SORE
DESCRIPTORS: SORE

## 2023-05-17 ASSESSMENT — PAIN DESCRIPTION - LOCATION
LOCATION: ABDOMEN;INCISION
LOCATION: INCISION
LOCATION: ABDOMEN;INCISION
LOCATION: INCISION
LOCATION: INCISION
LOCATION: ABDOMEN;INCISION
LOCATION: ABDOMEN;INCISION

## 2023-05-17 ASSESSMENT — PAIN DESCRIPTION - ORIENTATION
ORIENTATION: LOWER
ORIENTATION: LOWER
ORIENTATION: ANTERIOR
ORIENTATION: ANTERIOR

## 2023-05-17 ASSESSMENT — PAIN SCALES - GENERAL
PAINLEVEL_OUTOF10: 0
PAINLEVEL_OUTOF10: 5
PAINLEVEL_OUTOF10: 4
PAINLEVEL_OUTOF10: 5
PAINLEVEL_OUTOF10: 4
PAINLEVEL_OUTOF10: 5
PAINLEVEL_OUTOF10: 4
PAINLEVEL_OUTOF10: 4

## 2023-05-17 NOTE — PROGRESS NOTES
Post-Operative  Progress Note      Patient doing well without significant complaint. Nausea and vomiting resolved. She is tolerating oral intake. =  Having moderate pain. Information for the patient's :  Eileen Escobedo [[id number]]   , Low Transverse      Delivery information:  Delivery Method: N/A  Review the Delivery Report for details.    Kaylan Eddy [[id number]]      [place] Information    Head delivery date/time: 5/15/2023 22:27:00   Changing the 's delivery date/time could affect patient care.:      Delivery date/time:  5/15/23 2227   Delivery type: , Low Transverse    Details:  Trial of labor?: Yes    categorization: Primary    priority: Non-scheduled   Indications for : Arrest of Descent, Active Phase Arrest   Skin Incision Type: Pfannenstiel     Uterine Incision: Low Transverse                      Vitals:  Patient Vitals for the past 12 hrs:   Temp Pulse Resp BP SpO2   23 0759 -- -- 16 -- --   23 0645 98.2 °F (36.8 °C) 74 18 101/68 95 %   23 0257 98 °F (36.7 °C) 82 16 96/61 96 %   23 2217 97.9 °F (36.6 °C) 83 18 95/63 97 %       Temp (24hrs), Av.1 °F (36.7 °C), Min:97.5 °F (36.4 °C), Max:98.5 °F (36.9 °C)      Last 24hr Input/Output:    Intake/Output Summary (Last 24 hours) at 2023 7283  Last data filed at 2023 1845  Gross per 24 hour   Intake --   Output 1074 ml   Net -1074 ml        Exam:    Gen: Patient without distress  Lungs: clear to ascultation bilaterally  Cor: S1, S2, regular rate and rhythm  Abdomen: bowel sounds present, soft, appropriate tenderness, fundus firm   Incision: clean, dry and intact  Lower extremities: negative for cords or tenderness, trace edema bilaterally    Labs:   Recent Results (from the past 24 hour(s))   CBC with Manual Differential    Collection Time: 23  2:12 AM   Result Value Ref Range    WBC 13.8 (H) 3.6 - 11.0 K/uL    RBC

## 2023-05-17 NOTE — PROGRESS NOTES
S: RN called to report that patient's IV has infiltrated, discussed meds with with Dr. Ajay Armijo and since patient got 3 doses of IV Ancef and dose was due to  in 8 hrs ok to d/c at this time as patient does not desire IV replacement.   P: IV Ancef d/c    Zeus Viera CNM

## 2023-05-18 ENCOUNTER — TELEPHONE (OUTPATIENT)
Age: 28
End: 2023-05-18

## 2023-05-18 VITALS
OXYGEN SATURATION: 98 % | RESPIRATION RATE: 18 BRPM | TEMPERATURE: 98 F | DIASTOLIC BLOOD PRESSURE: 80 MMHG | SYSTOLIC BLOOD PRESSURE: 120 MMHG | HEART RATE: 98 BPM

## 2023-05-18 PROCEDURE — 6370000000 HC RX 637 (ALT 250 FOR IP): Performed by: OBSTETRICS & GYNECOLOGY

## 2023-05-18 RX ORDER — HYDROCODONE BITARTRATE AND ACETAMINOPHEN 5; 325 MG/1; MG/1
1 TABLET ORAL EVERY 6 HOURS PRN
Qty: 20 TABLET | Refills: 0 | Status: SHIPPED | OUTPATIENT
Start: 2023-05-18 | End: 2023-05-19 | Stop reason: RX

## 2023-05-18 RX ORDER — IBUPROFEN 800 MG/1
800 TABLET ORAL EVERY 8 HOURS PRN
Qty: 30 TABLET | Refills: 0 | Status: SHIPPED | OUTPATIENT
Start: 2023-05-18 | End: 2023-05-25

## 2023-05-18 RX ADMIN — IBUPROFEN 800 MG: 800 TABLET ORAL at 08:13

## 2023-05-18 RX ADMIN — DOCUSATE SODIUM 100 MG: 100 CAPSULE, LIQUID FILLED ORAL at 08:13

## 2023-05-18 RX ADMIN — OXYCODONE 10 MG: 5 TABLET ORAL at 08:13

## 2023-05-18 RX ADMIN — OXYCODONE 10 MG: 5 TABLET ORAL at 03:38

## 2023-05-18 ASSESSMENT — PAIN DESCRIPTION - DESCRIPTORS: DESCRIPTORS: ACHING

## 2023-05-18 ASSESSMENT — PAIN SCALES - GENERAL
PAINLEVEL_OUTOF10: 6
PAINLEVEL_OUTOF10: 3

## 2023-05-18 ASSESSMENT — PAIN DESCRIPTION - ORIENTATION: ORIENTATION: ANTERIOR

## 2023-05-18 ASSESSMENT — PAIN DESCRIPTION - LOCATION: LOCATION: ABDOMEN;INCISION

## 2023-05-18 ASSESSMENT — PAIN - FUNCTIONAL ASSESSMENT: PAIN_FUNCTIONAL_ASSESSMENT: ACTIVITIES ARE NOT PREVENTED

## 2023-05-18 NOTE — DISCHARGE SUMMARY
buPROPion (WELLBUTRIN XL) 300 MG extended release tablet Take 1 tablet by mouth daily  Qty: 30 tablet, Refills: 2      Prenatal Vit w/Tg-Hrrmxrvrx-LX (PNV PO) Take by mouth           STOP taking these medications       docusate sodium (COLACE) 100 MG capsule Comments:   Reason for Stopping:         vitamin D 25 MCG (1000 UT) CAPS Comments:   Reason for Stopping:         aspirin 81 MG chewable tablet Comments:   Reason for Stopping:         busPIRone (BUSPAR) 7.5 MG tablet Comments:   Reason for Stopping:         famotidine (PEPCID) 20 MG tablet Comments:   Reason for Stopping:               Reference my discharge instructions.       Signed By:  Cathleen Mccann MD     May 18, 2023

## 2023-05-18 NOTE — PROGRESS NOTES
Pt discharged home. Discharge instructions and education completed and patient reported she had no more questions. Bands verified on patients and infant, see footprint sheet. Infant placed in car seat by parent.      Prescriptions e sent by MD

## 2023-05-18 NOTE — LACTATION NOTE
This note was copied from a baby's chart. Gave patient a pumping log. Stated that [facility] had already been in to help her.
This note was copied from a baby's chart. Mother has been pumping with symphony breast pump Q 3 hours - encouraged her to pump Q 2-3 hours or at least 8 times in 24 hours. She is currently getting drops of colostrum and finger feeds it to baby. Baby is also getting donor breast milk in a bottle. Encouraged mother to massage breasts prior to pumping and hand express after pumping to help stimulate her breast milk supply. Pumping:  Guidelines for pumping, milk collection and storage, proper cleaning of pump parts all reviewed. How to establish and maintain breast milk supply through pumping reviewed. Differences between hospital grade rental pumps vs store bought double electric/hand pumps discussed. Set up pumping with double electric set up in room. List of area pump rental locations and lactation support services provided. Left Breast: Soft, Other (Comment) (Both breasts medium / large)  Left Nipple: Protrude, Other (Comment) (short)  Right Nipple: Protrude, Other (Comment) (short)  Right Breast: Soft           Infant Supplementation: Bottle, Other (Comment) (Infant getting drops of colostrum from mother and donor donor breast milk in a bottle.)                          Breast Care: Bra on, Pumping supply provided      Encouraged mother to call [facility] for breastfeeding/pumping assistance.
This note was copied from a baby's chart. Mother only wants to pump and give her breast milk in a bottle. She has been giving her baby donor breast milk. She has been using the symphony pump Q 2-3 hours and is getting drops of colostrum. Mother has a Spectra pump for home use. Discussed with mother her plan for feeding. Reviewed the benefits of exclusive breast milk feeding during the hospital stay. Informed her of the risks of using formula to supplement in the first few days of life as well as the benefits of successful breast milk feeding; referred her to the Breastfeeding booklet about this information. She acknowledges understanding of information reviewed and states that it is her plan to breastfeed her infant. Will support her choice and offer additional information as needed. Hand Expression Education:  Mom taught how to manually hand express her colostrum. Emphasized the importance of providing infant with valuable colostrum as infant rests skin to skin at breast.  Aware to avoid extended periods of non-feeding. Aware to offer 10-20+ drops of colostrum every 2-3 hours until infant is latching and nursing effectively. Taught the rationale behind this low tech but highly effective evidence based practice. Pumping:  Guidelines for pumping, milk collection and storage, proper cleaning of pump parts all reviewed. How to establish and maintain breast milk supply through pumping reviewed. Differences between hospital grade rental pumps vs store bought double electric/hand pumps discussed. Set up pumping with double electric set up. Assisted with pump session. List of area pump rental locations and lactation support services provided. Discussed eating a healthy diet. Instructed mother to eat a variety of foods in order to get a well balanced diet.  She should consume an extra 500 calories per day (more than her non-pregnant requirement.) These extra calories will help provide energy
and to notify her OB doctor. Chart shows numerous feedings, void, stool WNL. Discussed importance of monitoring outputs and feedings on first week of life. Discussed ways to tell if baby is  getting enough breast milk, ie  voids and stools, change in color of stool, and return to birth wt within 2 weeks. Follow up with pediatrician visit for weight check in 1-2 days (per AAP guidelines.)  Encouraged to call Warm Line  [phone]  for any questions/problems that arise.  Mother also given breastfeeding support group dates and times for any future needs

## 2023-05-18 NOTE — PROGRESS NOTES
Post-Operative  Progress Note      Patient doing well without significant complaint. Nausea and vomiting resolved. She is tolerating oral intake. Pain control improved on oxycodone 10. Support belt is helping. Delivery information:  Delivery Method: N/A  Review the Delivery Report for details. Kaylan Silva [[id number]]      [place] Information    Head delivery date/time: 5/15/2023 22:27:00   Changing the 's delivery date/time could affect patient care.:      Delivery date/time:  5/15/23 2227   Delivery type: , Low Transverse    Details:  Trial of labor?: Yes    categorization: Primary    priority: Non-scheduled   Indications for : Arrest of Descent, Active Phase Arrest   Skin Incision Type: Pfannenstiel     Uterine Incision: Low Transverse                      Vitals:  Patient Vitals for the past 12 hrs:   Temp Pulse Resp BP SpO2   23 0813 -- -- 18 -- --   23 0737 98 °F (36.7 °C) 98 16 120/80 98 %   23 2347 98.1 °F (36.7 °C) 96 16 120/64 96 %       Temp (24hrs), Av.9 °F (36.6 °C), Min:97.5 °F (36.4 °C), Max:98.1 °F (36.7 °C)      Last 24hr Input/Output:  No intake or output data in the 24 hours ending 23 0940     Exam:    Gen: Patient without distress  Abdomen: bowel sounds present, soft, appropriate tenderness, fundus firm   Incision: clean, dry and intact  Lower extremities: negative for cords or tenderness, trace/1+ edema bilaterally    Labs:   No results found for this or any previous visit (from the past 24 hour(s)). Lab Results   Component Value Date/Time    HGB 9.2 2023 02:12 AM       Assessment:   Post-Op , stable  POP 3    Plan:     1. Routine post-operative care  2. Encouraged out of bed and ambulation  3. Oral pain medications  4. Advance diet  5. Continue postpartum and  teaching by nursing  6.  The risks and benefits of the circumcision  procedure and anesthesia including:

## 2023-05-18 NOTE — DISCHARGE INSTRUCTIONS
[facility]  [email or web address]  [phone]    Geofm Favre, MD, FACOG     POST DELIVERY DISCHARGE INSTRUCTIONS  FROM YOUR PHYSICIAN    Name: Erwin Thacker  YOB: 1995    General:     Read all discharge information provided by the hospital    Anemia:  Your results show some anemia, or low blood count. You should start, or add, an over the counter elemental iron supplement of 45-65 mg. Consider 'Slow FE' or ferrous sulfate 325 mg once a day for at least three months. Also take vitamin C 250 mg and a daily prenatal vitamin to help anemia. Add a stool softener, like docusate or colace 100 mg (2x/day) to avoid constipation. If you do not tolerate supplements you can try blackstrap molasses (1 tbsp=27mg elemental iron). Diet/Diet Restrictions:  Eat healthy meals and snacks as desired. Eat foods that are high in fiber and low in fat and cholesterol. Drink eight 8-ounce glasses of water daily; avoid excessive caffeine intake. http://www.caleb-lowry.org/. html  EliteClients.be    Medications:   See discharge medication list and read instructions carefully. Breast Feeding:  See instructions from your lactation consult. Call [phone] for more information or to locate a lactation consultant. https://www.saldivar.info/    Vaccines:  If you received the MMR vaccine postpartum you should wait three months until you get pregnant again. You, and close contacts, should make sure that the Tdap vaccine is up to date. This vaccine can decrease the risk of your baby getting pertussis or \"whooping cough. \"    You, and close contacts, should receive the influenza vaccine during flu season when appropriate. [email or web address].[email or web address]    Tobacco Use:   If you (or other people around the baby) smoke or use tobacco products, please try to  use

## 2023-05-19 ENCOUNTER — TELEPHONE (OUTPATIENT)
Age: 28
End: 2023-05-19

## 2023-05-19 DIAGNOSIS — Z98.891 H/O: C-SECTION: Primary | ICD-10-CM

## 2023-05-19 RX ORDER — HYDROCODONE BITARTRATE AND ACETAMINOPHEN 5; 325 MG/1; MG/1
1 TABLET ORAL EVERY 6 HOURS PRN
Qty: 12 TABLET | Refills: 0 | Status: SHIPPED | OUTPATIENT
Start: 2023-05-19 | End: 2023-05-22

## 2023-05-19 NOTE — TELEPHONE ENCOUNTER
Casie Paris [facility]     1:31 PM  Note     Pt last seen in office 23 and discharged today from having baby. She states the [place] Rx that was sent to [facility] is on backorder with [facility] within a 25 mile radius. The pharmacist told pt either another Rx of Norco with different strength can be sent to them or the Rx will have to go to another pharmacy. Pt would [facility] but if you need to send it to another pharmacy, please send to [facility] listed in chart.                 Two patient identifiers used    29year old patient was discharged yesterday from having her  on 2023    Patient not able to get her norco     See the above message    Patient calling back a second time    [facility] to [facility] that is confirmed in the chart    Thank you

## 2023-05-23 ENCOUNTER — OFFICE VISIT (OUTPATIENT)
Age: 28
End: 2023-05-23

## 2023-05-23 ENCOUNTER — TELEPHONE (OUTPATIENT)
Age: 28
End: 2023-05-23

## 2023-05-23 VITALS
WEIGHT: 218 LBS | DIASTOLIC BLOOD PRESSURE: 86 MMHG | BODY MASS INDEX: 37.42 KG/M2 | SYSTOLIC BLOOD PRESSURE: 129 MMHG | TEMPERATURE: 98.1 F | HEART RATE: 81 BPM

## 2023-05-23 DIAGNOSIS — M79.89 SWELLING OF BOTH LOWER EXTREMITIES: ICD-10-CM

## 2023-05-23 DIAGNOSIS — L76.82 INCISIONAL PAIN: Primary | ICD-10-CM

## 2023-05-23 NOTE — TELEPHONE ENCOUNTER
Ms. Agustina Rubio is here today for anticoagulation monitoring for the diagnosis of Atrial Fibrillation. Her INR goal is 2.0-3.0 and her current Coumadin dose is 5 mg daily. Today's findings include an INR of 2.9     Considering Ms. Daugherty's past history, todays findings, and per the coumadin policy/protocol, Ms. Daugherty was instructed to take Coumadin as follows,  Continue 5 mg daily. She was also instructed to come back in 1 weeks for an INR check. A full discussion of the nature of anticoagulants has been carried out. A full discussion of the need for frequent and regular monitoring, precise dosage adjustment and compliance was stressed. Side effects of potential bleeding were discussed and Ms. Daugherty was instructed to call [phone] if there are any signs of abnormal bleeding. Ms. Shruthi Mata was instructed to avoid any OTC items containing aspirin or ibuprofen and prior to starting any new OTC products to consult with her physician or pharmacist to ensure no drug interactions are present. Ms. Shruthi Mata was instructed to avoid any major changes in her general diet and to avoid alcohol consumption. .      Ms. Daugherty verbalized her understanding of all instructions and will call the office with any questions, concerns, or signs of abnormal bleeding or blood clot. She is also aware that it is ok to hold her coumadin for 5 days prior to surgery. Two patient identifiers used        29year old patient delivered on 2023  delivery      TP patient called on call last night and advised to call in the am to have incision checked    Patient reports low grade temperature and currently temperature 99.9, and hard knot on right side of incision, both ends of incision with light yellow drainage , slight odor and tenderness    Patient advised to clean area and put maternity pad on to keep clean    Patient was called back and placed on the schedule to be seen today at 11:00am by the work in MD, Dr. Joselyn Sharpe    Patient verbalized understanding.

## 2023-05-23 NOTE — PROGRESS NOTES
Fely Escalona is a 29 y.o. female presents for a problem visit. Chief Complaint   Patient presents with    Wound Check     C-sx 1 week ago     Patient's last menstrual period was 07/28/2022. The patient is reporting having:  some wound drainage on each end with a low grade temp  for 2 days. She was afebrile here in office, but took ibuprofen about an hour and a half ago  She reports the symptoms are unchanged. She describes the drainage as a clearish yellow with an odor. Reports some tenderness on both ends where drainage is occurring.
increasing. Monitor for signs of infection (drainage, redness, increasing pain)  Monitor temp  Monitor BP. Call immediately 160/110. Notify Dr. Lisa Melendez if consistenly 140s/90s. Should be OK to cont to monitor if 130s/80s  Anticipate LE edema will cont to improve over next week or so. Notify office if worsens  Keep incision clean and dry. Can use blow dryer after showering. Can place gauze to wick away moisture. Keep postop check with Dr. Lisa Melendez as scheduled for next week.

## 2023-05-31 ENCOUNTER — OFFICE VISIT (OUTPATIENT)
Age: 28
End: 2023-05-31

## 2023-05-31 VITALS
DIASTOLIC BLOOD PRESSURE: 85 MMHG | BODY MASS INDEX: 36.19 KG/M2 | HEIGHT: 64 IN | WEIGHT: 212 LBS | HEART RATE: 114 BPM | SYSTOLIC BLOOD PRESSURE: 129 MMHG

## 2023-05-31 DIAGNOSIS — Z98.891 H/O: C-SECTION: Primary | ICD-10-CM

## 2023-05-31 PROCEDURE — 0502F SUBSEQUENT PRENATAL CARE: CPT | Performed by: OBSTETRICS & GYNECOLOGY

## 2023-05-31 NOTE — PROGRESS NOTES
[facility]  [email or web address]  [address] MD Rafa, FACOG     INCISION CHECK PROGRESS NOTE    Subjective:     Chief Complaint   Patient presents with    Wound Check        Sudha Russo is a 29 y.o. female who presents today for wound check. She has a pfannenstiel wound which is located on her abdomen. She had a CS procedure on 5/15/23. Still having VB persistent x 1 weeks with some small clots. Less bleeding than POP. Baby doing well.  +numbness left fingers/hand  Left handed. Minimal drainage from right side of incision. Current symptoms: . Interventions to date: early pop visit w Dr. Gabriele Luna      ROOMING NOTE:  Sudha Russo is a 29 y.o. female presents for a problem visit. Patient's last menstrual period was 2022. Birth Control: none. Last Pap: approximate date 10/2022 and was normal  Last or next WWE is: due in 3+ months     The patient is reporting having: a primary  on 5/15/23. Pt saw Dr. Gabriele Luna last week d/t oozing & pain from inc, WNL now, pt also reported abdominal pain last week which has also subsided. Pt reports no current concerns. This is a new problem. She has not experienced this problem before. She reports the symptoms are has improved. Aggravating factors include none. And alleviating factors include none. She does not have other concerns. Past Medical History:   Diagnosis Date    Chronic depression      Past Surgical History:   Procedure Laterality Date     SECTION N/A 5/15/2023     SECTION performed by Antwan Mathew MD at [facility] L&D OR     Current Outpatient Medications   Medication Sig    buPROPion (WELLBUTRIN XL) 300 MG extended release tablet Take 1 tablet by mouth daily    Prenatal Vit w/Ey-Vccksxpiw-PY (PNV PO) Take by mouth    ibuprofen (ADVIL;MOTRIN) 800 MG tablet Take 1 tablet by mouth every 8 hours as needed for Pain Take with food.      No current
Rooming note, gyn problem visit:    Chief Complaint   Patient presents with    Wound Check     Nahid Lin is a 29 y.o. female presents for a problem visit. Patient's last menstrual period was 2022. Birth Control: none. Last Pap: approximate date 10/2022 and was normal  Last or next WWE is: due in 3+ months    The patient is reporting having: a primary  on 5/15/23. Pt saw Dr. Aurelia Macias last week d/t oozing & pain from inc, [facility] now, pt also reported abdominal pain last week which has also subsided. Pt reports no current concerns. This is a new problem. She has not experienced this problem before. She reports the symptoms are has improved. Aggravating factors include none. And alleviating factors include none. She does not have other concerns.
Previously Declined (within the last year)

## 2023-06-26 ENCOUNTER — POSTPARTUM VISIT (OUTPATIENT)
Age: 28
End: 2023-06-26

## 2023-06-26 VITALS
DIASTOLIC BLOOD PRESSURE: 87 MMHG | SYSTOLIC BLOOD PRESSURE: 117 MMHG | HEIGHT: 64 IN | WEIGHT: 208 LBS | HEART RATE: 106 BPM | BODY MASS INDEX: 35.51 KG/M2

## 2023-06-26 DIAGNOSIS — Z98.891 H/O: C-SECTION: ICD-10-CM

## 2023-06-26 PROBLEM — Z34.00 PRENATAL CARE OF PRIMIGRAVIDA, ANTEPARTUM: Status: RESOLVED | Noted: 2022-10-06 | Resolved: 2023-05-15

## 2023-06-26 PROCEDURE — 0503F POSTPARTUM CARE VISIT: CPT | Performed by: OBSTETRICS & GYNECOLOGY

## 2023-06-26 RX ORDER — DROSPIRENONE 4 MG/1
1 TABLET, FILM COATED ORAL DAILY
Qty: 90 TABLET | Refills: 0 | Status: SHIPPED | OUTPATIENT
Start: 2023-06-26

## 2023-08-10 ENCOUNTER — TELEPHONE (OUTPATIENT)
Age: 28
End: 2023-08-10

## 2023-08-10 RX ORDER — BUPROPION HYDROCHLORIDE 300 MG/1
TABLET ORAL
Qty: 90 TABLET | Refills: 0 | Status: SHIPPED | OUTPATIENT
Start: 2023-08-10

## 2023-08-10 NOTE — TELEPHONE ENCOUNTER
29year old patient last seen in the office on 6/26/2023 for pp visit and has ae on 10/5/2023         ?  Ok to refill to get to above scheduled appointment    Please amend/sign    Thank you

## 2023-09-21 ENCOUNTER — TELEPHONE (OUTPATIENT)
Age: 28
End: 2023-09-21

## 2023-09-21 RX ORDER — BUPROPION HYDROCHLORIDE 300 MG/1
300 TABLET ORAL DAILY
Qty: 90 TABLET | Refills: 0 | Status: SHIPPED | OUTPATIENT
Start: 2023-09-21

## 2023-09-21 RX ORDER — DROSPIRENONE 4 MG/1
1 TABLET, FILM COATED ORAL DAILY
Qty: 90 TABLET | Refills: 0 | Status: SHIPPED | OUTPATIENT
Start: 2023-09-21

## 2023-09-21 NOTE — TELEPHONE ENCOUNTER
29year old patient last seen in the office on 6/26/2023 for pp visit    Patient has upcoming appointment on 10/5/2023 for ae    Prescriptions pended for MD to sign    Please review , amend, sign    Thank you

## 2023-12-06 RX ORDER — BUPROPION HYDROCHLORIDE 300 MG/1
300 TABLET ORAL DAILY
Qty: 90 TABLET | Refills: 0 | OUTPATIENT
Start: 2023-12-06

## 2023-12-06 RX ORDER — DROSPIRENONE 4 MG/1
1 TABLET, FILM COATED ORAL DAILY
Qty: 90 TABLET | Refills: 0 | OUTPATIENT
Start: 2023-12-06

## 2023-12-07 ENCOUNTER — TELEPHONE (OUTPATIENT)
Age: 28
End: 2023-12-07

## 2023-12-07 RX ORDER — BUPROPION HYDROCHLORIDE 300 MG/1
300 TABLET ORAL DAILY
Qty: 90 TABLET | Refills: 0 | Status: SHIPPED | OUTPATIENT
Start: 2023-12-07

## 2023-12-07 RX ORDER — DROSPIRENONE 4 MG/1
1 TABLET, FILM COATED ORAL DAILY
Qty: 90 TABLET | Refills: 0 | Status: SHIPPED | OUTPATIENT
Start: 2023-12-07

## 2023-12-07 NOTE — TELEPHONE ENCOUNTER
Victorino Barragan MD   to Me       12/7/23 10:32 AM  Signed x 3mos  Addl refills will need fu  Rec keep appt    TRP      Patient advised of MD sent prescription and recommendations to keep follow up to be able to get further refills    Patient verbalized understanding.

## 2023-12-07 NOTE — TELEPHONE ENCOUNTER
Two patient identifiers used    29year old patient last seen in the office on 6/26/2023 for pp and currently has next ae on 1/31/2024    Patient is asking for refill of her Drospirenone (SLYND) 4 MG TABS [[id number]]  and Medication  buPROPion (WELLBUTRIN XL) 300 MG extended release tablet [[id number]]  buPROPion (WELLBUTRIN XL) 300 MG extended release tablet [[id number]]    Order Details  Dose: 300 mg Route: Oral Frequency: DAILY   Dispense Quantity: 90 tablet         Prescriptions pended for MD to review and sign to get patient to her scheduled appointment    Patient advised of need to keep appointment in order to get further refills    Patient verbalized understanding

## 2024-01-28 SDOH — ECONOMIC STABILITY: FOOD INSECURITY: WITHIN THE PAST 12 MONTHS, YOU WORRIED THAT YOUR FOOD WOULD RUN OUT BEFORE YOU GOT MONEY TO BUY MORE.: NEVER TRUE

## 2024-01-28 SDOH — ECONOMIC STABILITY: FOOD INSECURITY: WITHIN THE PAST 12 MONTHS, THE FOOD YOU BOUGHT JUST DIDN'T LAST AND YOU DIDN'T HAVE MONEY TO GET MORE.: NEVER TRUE

## 2024-01-28 SDOH — ECONOMIC STABILITY: INCOME INSECURITY: HOW HARD IS IT FOR YOU TO PAY FOR THE VERY BASICS LIKE FOOD, HOUSING, MEDICAL CARE, AND HEATING?: NOT HARD AT ALL

## 2024-01-28 SDOH — ECONOMIC STABILITY: TRANSPORTATION INSECURITY
IN THE PAST 12 MONTHS, HAS LACK OF TRANSPORTATION KEPT YOU FROM MEETINGS, WORK, OR FROM GETTING THINGS NEEDED FOR DAILY LIVING?: NO

## 2024-01-28 SDOH — ECONOMIC STABILITY: HOUSING INSECURITY
IN THE LAST 12 MONTHS, WAS THERE A TIME WHEN YOU DID NOT HAVE A STEADY PLACE TO SLEEP OR SLEPT IN A SHELTER (INCLUDING NOW)?: NO

## 2024-01-28 ASSESSMENT — PATIENT HEALTH QUESTIONNAIRE - PHQ9
7. TROUBLE CONCENTRATING ON THINGS, SUCH AS READING THE NEWSPAPER OR WATCHING TELEVISION: 0
SUM OF ALL RESPONSES TO PHQ QUESTIONS 1-9: 9
10. IF YOU CHECKED OFF ANY PROBLEMS, HOW DIFFICULT HAVE THESE PROBLEMS MADE IT FOR YOU TO DO YOUR WORK, TAKE CARE OF THINGS AT HOME, OR GET ALONG WITH OTHER PEOPLE: 0
5. POOR APPETITE OR OVEREATING: NOT AT ALL
4. FEELING TIRED OR HAVING LITTLE ENERGY: 3
SUM OF ALL RESPONSES TO PHQ QUESTIONS 1-9: 9
2. FEELING DOWN, DEPRESSED OR HOPELESS: 1
2. FEELING DOWN, DEPRESSED OR HOPELESS: SEVERAL DAYS
SUM OF ALL RESPONSES TO PHQ QUESTIONS 1-9: 9
9. THOUGHTS THAT YOU WOULD BE BETTER OFF DEAD, OR OF HURTING YOURSELF: 0
SUM OF ALL RESPONSES TO PHQ QUESTIONS 1-9: 9
3. TROUBLE FALLING OR STAYING ASLEEP: 3
1. LITTLE INTEREST OR PLEASURE IN DOING THINGS: SEVERAL DAYS
5. POOR APPETITE OR OVEREATING: 0
SUM OF ALL RESPONSES TO PHQ QUESTIONS 1-9: 9
6. FEELING BAD ABOUT YOURSELF - OR THAT YOU ARE A FAILURE OR HAVE LET YOURSELF OR YOUR FAMILY DOWN: 1
6. FEELING BAD ABOUT YOURSELF - OR THAT YOU ARE A FAILURE OR HAVE LET YOURSELF OR YOUR FAMILY DOWN: SEVERAL DAYS
8. MOVING OR SPEAKING SO SLOWLY THAT OTHER PEOPLE COULD HAVE NOTICED. OR THE OPPOSITE - BEING SO FIDGETY OR RESTLESS THAT YOU HAVE BEEN MOVING AROUND A LOT MORE THAN USUAL: NOT AT ALL
9. THOUGHTS THAT YOU WOULD BE BETTER OFF DEAD, OR OF HURTING YOURSELF: NOT AT ALL
8. MOVING OR SPEAKING SO SLOWLY THAT OTHER PEOPLE COULD HAVE NOTICED. OR THE OPPOSITE, BEING SO FIGETY OR RESTLESS THAT YOU HAVE BEEN MOVING AROUND A LOT MORE THAN USUAL: 0
4. FEELING TIRED OR HAVING LITTLE ENERGY: NEARLY EVERY DAY
7. TROUBLE CONCENTRATING ON THINGS, SUCH AS READING THE NEWSPAPER OR WATCHING TELEVISION: NOT AT ALL
10. IF YOU CHECKED OFF ANY PROBLEMS, HOW DIFFICULT HAVE THESE PROBLEMS MADE IT FOR YOU TO DO YOUR WORK, TAKE CARE OF THINGS AT HOME, OR GET ALONG WITH OTHER PEOPLE: NOT DIFFICULT AT ALL
SUM OF ALL RESPONSES TO PHQ9 QUESTIONS 1 & 2: 2
3. TROUBLE FALLING OR STAYING ASLEEP: NEARLY EVERY DAY
1. LITTLE INTEREST OR PLEASURE IN DOING THINGS: 1

## 2024-01-31 ENCOUNTER — OFFICE VISIT (OUTPATIENT)
Age: 29
End: 2024-01-31
Payer: OTHER GOVERNMENT

## 2024-01-31 VITALS
BODY MASS INDEX: 35.96 KG/M2 | SYSTOLIC BLOOD PRESSURE: 116 MMHG | HEART RATE: 105 BPM | WEIGHT: 210.6 LBS | DIASTOLIC BLOOD PRESSURE: 77 MMHG | HEIGHT: 64 IN

## 2024-01-31 DIAGNOSIS — N93.9 ABNORMAL UTERINE BLEEDING (AUB): ICD-10-CM

## 2024-01-31 DIAGNOSIS — N90.89 VULVAR LESION: ICD-10-CM

## 2024-01-31 DIAGNOSIS — N94.6 DYSMENORRHEA: ICD-10-CM

## 2024-01-31 DIAGNOSIS — Z01.419 ENCOUNTER FOR GYNECOLOGICAL EXAMINATION: Primary | ICD-10-CM

## 2024-01-31 LAB
HCG, PREGNANCY, URINE, POC: NEGATIVE
VALID INTERNAL CONTROL, POC: YES

## 2024-01-31 PROCEDURE — 99395 PREV VISIT EST AGE 18-39: CPT | Performed by: OBSTETRICS & GYNECOLOGY

## 2024-01-31 PROCEDURE — 81025 URINE PREGNANCY TEST: CPT | Performed by: OBSTETRICS & GYNECOLOGY

## 2024-01-31 RX ORDER — DROSPIRENONE 4 MG/1
1 TABLET, FILM COATED ORAL DAILY
Qty: 90 TABLET | Refills: 4 | Status: CANCELLED | OUTPATIENT
Start: 2024-01-31

## 2024-01-31 NOTE — PROGRESS NOTES
Radha Fried is a 28 y.o. female returns for an annual exam     Chief Complaint   Patient presents with    Annual Exam       Patient's last menstrual period was 01/23/2024.  Her periods are spotting in flow and often irregular with no apparent pattern.  She does not have dysmenorrhea.  Problems: stopped pumping mid December 2023. Has had random spotting while being on Slynd, most recent spotting was 1/23/2024, brown in color. Interested in switching from Slynd to something she can have since she stopped breastfeeding, pt interested in the Nexplanon.   Birth Control: oral progesterone-only contraceptive, Slynd, was getting from [employer] for $80 for 3 months.  Last Pap: normal obtained 10/2022  She does not have a history of NGUYEN 2, 3 or cervical cancer.   With regard to the Gardisil vaccine, she declines to receive it      Examination chaperoned by Kait Terry MA.  
0     No current facility-administered medications for this visit.      OB History    Para Term  AB Living   1 1 1     1   SAB IAB Ectopic Molar Multiple Live Births           0 1      # Outcome Date GA Lbr Hunter/2nd Weight Sex Delivery Anes PTL Lv   1 Term 05/15/23 41w4d  4.34 kg (9 lb 9.1 oz) M CS-LTranv EPI N SAKINA      Complications: Failure to Progress in First Stage     Past Surgical History:   Procedure Laterality Date     SECTION N/A 5/15/2023     SECTION performed by Russell Bingham MD at [facility] L[facility]D [facility]     History reviewed. No pertinent family history.  Social History     Socioeconomic History    Marital status:      Spouse name: Not on file    Number of children: Not on file    Years of education: Not on file    Highest education level: Not on file   Occupational History    Not on file   Tobacco Use    Smoking status: Never    Smokeless tobacco: Never   Vaping Use    Vaping Use: Never used   Substance and Sexual Activity    Alcohol use: Not Currently    Drug use: Never    Sexual activity: Yes     Partners: Male   Other Topics Concern    Not on file   Social History Narrative    Not on file     Social Determinants of Health     Financial Resource Strain: Not on file   Food Insecurity: Not on file (2024)   Transportation Needs: Not on file   Physical Activity: Not on file   Stress: Not on file   Social Connections: Not on file   Intimate Partner Violence: Not on file   Housing Stability: Not on file     Tobacco History:  reports that she has never smoked. She has never used smokeless tobacco.  Alcohol Abuse:  reports that she does not currently use alcohol.  Drug Abuse:  reports no history of drug use.  Allergies: No Known Allergies  Patient Active Problem List   Diagnosis    Palpitations    Elevated blood pressure affecting pregnancy in third trimester, antepartum    Post-dates pregnancy       Review of Systems - History obtained from the patient and patient filled out

## 2024-03-18 ENCOUNTER — PROCEDURE VISIT (OUTPATIENT)
Age: 29
End: 2024-03-18
Payer: OTHER GOVERNMENT

## 2024-03-18 VITALS
HEIGHT: 64 IN | WEIGHT: 212 LBS | DIASTOLIC BLOOD PRESSURE: 84 MMHG | SYSTOLIC BLOOD PRESSURE: 127 MMHG | BODY MASS INDEX: 36.19 KG/M2 | HEART RATE: 106 BPM

## 2024-03-18 DIAGNOSIS — Z30.017 NEXPLANON INSERTION: Primary | ICD-10-CM

## 2024-03-18 DIAGNOSIS — Z01.812 PRE-PROCEDURE LAB EXAM: ICD-10-CM

## 2024-03-18 DIAGNOSIS — Z76.89 ENCOUNTER FOR MENSTRUAL REGULATION: ICD-10-CM

## 2024-03-18 LAB
HCG, PREGNANCY, URINE, POC: NEGATIVE
VALID INTERNAL CONTROL, POC: YES

## 2024-03-18 PROCEDURE — 81025 URINE PREGNANCY TEST: CPT | Performed by: OBSTETRICS & GYNECOLOGY

## 2024-03-18 PROCEDURE — 11981 INSERTION DRUG DLVR IMPLANT: CPT | Performed by: OBSTETRICS & GYNECOLOGY

## 2024-03-18 NOTE — PROGRESS NOTES
Radha Fried is a 28 y.o. female presents for a problem visit.    Chief Complaint   Patient presents with    Nexplanon Insertion       Problems: nexplanon insertion to help heavy/painful cycles, has been having AUB with OCP  Sugar pills ended yesterday    Patient's last menstrual period was 03/06/2024.    Birth Control: OCP (estrogen/progesterone).    Last Pap:normal 10/2022    Device number NEXPLANON LOT [id number], expiration date 11/2025    NEXPLANON INSERTION    Chart reviewed for the following:   Kait CHAND MA, have reviewed the History, Physical and updated the Allergic reactions for Radha Fried     TIME OUT performed immediately prior to start of procedure:   Kait CHAND MA, have performed the following reviews on Radha Fried prior to the start of the procedure:            * Patient was identified by name and date of birth   * Agreement on procedure being performed was verified  * Risks and Benefits explained to the patient  * Procedure site verified and marked as necessary  * Patient was positioned for comfort  * Consent was signed and verified     Time: 3:15pm    Date of procedure: 3/18/2024    Procedure performed by:  Khadijah Munroe MD       Provider assisted by: Kait Terry CMA    Patient assisted by: self    How tolerated by patient: tolerated the procedure well with no complications    Post Procedural Pain Scale: 0 - No Hurt    Comments: none    Examination chaperoned by Kait Terry MA.

## 2024-03-18 NOTE — PROGRESS NOTES
[facility]  [facility]  [phone]    Khadijah Munroe MD, FACOG         OFFICE PROCEDURE PROGRESS NOTE      Procedure Note:  Endometrial biopsy    Chart reviewed for the following:   Khadijah CHAND MD, have reviewed the History, Physical and updated the Allergic reactions for Radha Fried     TIME OUT performed immediately prior to start of procedure:   Khadijah CHAND MD, have performed the following reviews on Radha Fried prior to the start of the procedure:            * Patient was identified by name and date of birth   * Agreement on procedure being performed was verified  * Risks and Benefits explained to the patient  * Procedure site verified and marked as necessary  * Patient was positioned for comfort  * Consent was signed and verified     Time: 4:10 PM      Date of procedure: 3/18/2024    Procedure performed by:  Khadijah Munroe MD    Provider assisted by: Kait Terry MA    Patient assisted by: self    How tolerated by patient: tolerated the procedure well with no complications    Post Procedural Pain Scale: 0 - No Hurt    Comments: none      Procedure note: Nexplanon insertion    Radha Fried is a ,  28 y.o. female White (non-) whose Patient's last menstrual period was 2024.  was on 3/6/2024 presents for office insertion of an NEXPLANON sub-dermal contraceptive implant.     She has a menstrual history positive for menstrual regulation    She has had an opportunity to read the NEXPLANON \"Patient Labeling and Consent Form\". We counseled the patient about the insertion and removal procedures as well as potential side-effects, benefits and risks. She state she had no further questions and signed the consent form.     She has been using  POP  to the present time. She confirmed that she has no allergies to Betadine or Xylocaine. She reclined on the examination table in the supine position with her left arm flexed

## 2024-04-16 ENCOUNTER — TELEPHONE (OUTPATIENT)
Age: 29
End: 2024-04-16

## 2024-04-16 NOTE — TELEPHONE ENCOUNTER
29 year old patient last seen in the office on 1/31/2024 for ae and had nexplanon inserted on 3/18/2024      Requested refill last sent on 12/2023 for three months    Please advise regarding requested prescription pended      Thank you

## 2024-04-17 RX ORDER — BUPROPION HYDROCHLORIDE 300 MG/1
300 TABLET ORAL DAILY
Qty: 90 TABLET | Refills: 0 | Status: SHIPPED | OUTPATIENT
Start: 2024-04-17

## 2024-04-18 ENCOUNTER — PATIENT MESSAGE (OUTPATIENT)
Age: 29
End: 2024-04-18

## 2024-04-18 NOTE — TELEPHONE ENCOUNTER
This nurse attempted to reach the patient and was had to leave a general message to read her my chart message sent about her prescription refill

## 2024-04-18 NOTE — TELEPHONE ENCOUNTER
Prescription sent for 3 months by MD    Patient was sent a my chart message regarding the refill and need to see PCP for further refills

## (undated) DEVICE — SOLUTION IV 1000ML 0.9% SOD CHL

## (undated) DEVICE — 3000CC GUARDIAN II: Brand: GUARDIAN

## (undated) DEVICE — SUTURE VCRL SZ 2-0 L27IN ABSRB VLT L40MM CT 1/2 CIR J351H

## (undated) DEVICE — SUTURE VCRL SZ 0 L36IN ABSRB VLT L36MM CT-1 1/2 CIR J346H

## (undated) DEVICE — TRAY,URINE METER,100% SILICONE,16FR10ML: Brand: MEDLINE

## (undated) DEVICE — SUTURE MCRYL SZ 0 L36IN ABSRB UD L36MM CT-1 1/2 CIR Y946H

## (undated) DEVICE — PAD,ABDOMINAL,5"X9",ST,LF,25/BX: Brand: MEDLINE INDUSTRIES, INC.

## (undated) DEVICE — STRIP,CLOSURE,WOUND,MEDI-STRIP,1/2X4: Brand: MEDLINE

## (undated) DEVICE — C-SECTION II-LF: Brand: MEDLINE INDUSTRIES, INC.

## (undated) DEVICE — MASTISOL ADHESIVE LIQ 2/3ML

## (undated) DEVICE — PENCIL ES L3M ROCK SWCH S STL HEX LOK BLDE ELECTRD HOLSTER

## (undated) DEVICE — SUTURE MCRYL SZ 4-0 L27IN ABSRB UD L19MM PS-2 1/2 CIR PRIM Y426H

## (undated) DEVICE — INTENT OT USE PROVIDES A STERILE INTERFACE BETWEEN THE OPERATING ROOM SURGICAL LAMPS (NON-STERILE) AND THE SURGEON OR STAFF WORKING IN THE STERILE FIELD.: Brand: ASPEN® ALC PLUS LIGHT HANDLE COVER

## (undated) DEVICE — TOWEL,OR,DSP,ST,BLUE,STD,2/PK,40PK/CS: Brand: MEDLINE

## (undated) DEVICE — TRAY PREP DRY W/ PREM GLV 2 APPL 6 SPNG 2 UNDPD 1 OVERWRAP

## (undated) DEVICE — ELECTRODE PT RET AD L9FT HI MOIST COND ADH HYDRGEL CORDED

## (undated) DEVICE — STERILE POLYISOPRENE POWDER-FREE SURGICAL GLOVES WITH EMOLLIENT COATING: Brand: PROTEXIS

## (undated) DEVICE — GARMENT,MEDLINE,DVT,INT,CALF,MED, GEN2: Brand: MEDLINE

## (undated) DEVICE — STERILE POLYISOPRENE POWDER-FREE SURGICAL GLOVES: Brand: PROTEXIS

## (undated) DEVICE — POOLE SUCTION INSTRUMENT WITH REMOVABLE SHEATH: Brand: POOLE

## (undated) DEVICE — 3M™ MEDIPORE™ H SOFT CLOTH SURGICAL TAPE, 2863, 3 IN X 10 YD, 12/CASE: Brand: 3M™ MEDIPORE™

## (undated) DEVICE — SOLIDIFIER FLUID 3000 CC ABSORB

## (undated) DEVICE — SUTURE PLN GUT SZ 2-0 L27IN ABSRB YELLOWISH TAN L70MM XLH 53T